# Patient Record
Sex: MALE | Race: WHITE | NOT HISPANIC OR LATINO | Employment: FULL TIME | URBAN - METROPOLITAN AREA
[De-identification: names, ages, dates, MRNs, and addresses within clinical notes are randomized per-mention and may not be internally consistent; named-entity substitution may affect disease eponyms.]

---

## 2017-01-27 ENCOUNTER — ALLSCRIPTS OFFICE VISIT (OUTPATIENT)
Dept: OTHER | Facility: OTHER | Age: 27
End: 2017-01-27

## 2017-05-02 ENCOUNTER — ALLSCRIPTS OFFICE VISIT (OUTPATIENT)
Dept: OTHER | Facility: OTHER | Age: 27
End: 2017-05-02

## 2017-06-19 ENCOUNTER — ALLSCRIPTS OFFICE VISIT (OUTPATIENT)
Dept: OTHER | Facility: OTHER | Age: 27
End: 2017-06-19

## 2017-06-19 DIAGNOSIS — R53.83 OTHER FATIGUE: ICD-10-CM

## 2017-06-19 DIAGNOSIS — M54.9 DORSALGIA: ICD-10-CM

## 2017-06-19 DIAGNOSIS — R74.02 NONSPECIFIC ELEVATION OF LEVELS OF TRANSAMINASE AND LACTIC ACID DEHYDROGENASE (LDH): ICD-10-CM

## 2017-06-19 DIAGNOSIS — R52 PAIN: ICD-10-CM

## 2017-06-19 DIAGNOSIS — R74.01 NONSPECIFIC ELEVATION OF LEVELS OF TRANSAMINASE AND LACTIC ACID DEHYDROGENASE (LDH): ICD-10-CM

## 2017-07-06 ENCOUNTER — GENERIC CONVERSION - ENCOUNTER (OUTPATIENT)
Dept: OTHER | Facility: OTHER | Age: 27
End: 2017-07-06

## 2017-07-06 LAB
AMBIG ABBREV CMP14 DEFAULT (HISTORICAL): NORMAL
BASOPHILS # BLD AUTO: 0.1 X10E3/UL (ref 0–0.2)
BASOPHILS # BLD AUTO: 1 %
DEPRECATED RDW RBC AUTO: 13.5 % (ref 12.3–15.4)
EOSINOPHIL # BLD AUTO: 0.8 X10E3/UL (ref 0–0.4)
EOSINOPHIL # BLD AUTO: 13 %
HCT VFR BLD AUTO: 44.4 % (ref 37.5–51)
HGB BLD-MCNC: 15.6 G/DL (ref 12.6–17.7)
IMM.GRANULOCYTES (CD4/8) (HISTORICAL): 0 %
IMM.GRANULOCYTES (CD4/8) (HISTORICAL): 0 X10E3/UL (ref 0–0.1)
LYMPHOCYTES # BLD AUTO: 1.8 X10E3/UL (ref 0.7–3.1)
LYMPHOCYTES # BLD AUTO: 29 %
MCH RBC QN AUTO: 29.5 PG (ref 26.6–33)
MCHC RBC AUTO-ENTMCNC: 35.1 G/DL (ref 31.5–35.7)
MCV RBC AUTO: 84 FL (ref 79–97)
MONOCYTES # BLD AUTO: 0.5 X10E3/UL (ref 0.1–0.9)
MONOCYTES (HISTORICAL): 7 %
NEUTROPHILS # BLD AUTO: 3.1 X10E3/UL (ref 1.4–7)
NEUTROPHILS # BLD AUTO: 50 %
PLATELET # BLD AUTO: 216 X10E3/UL (ref 150–379)
RBC (HISTORICAL): 5.29 X10E6/UL (ref 4.14–5.8)
WBC # BLD AUTO: 6.2 X10E3/UL (ref 3.4–10.8)

## 2017-07-07 LAB
25(OH)D3 SERPL-MCNC: 28.3 NG/ML (ref 30–100)
A/G RATIO (HISTORICAL): 1.7 (ref 1.2–2.2)
ALBUMIN SERPL BCP-MCNC: 4.7 G/DL (ref 3.5–5.5)
ALP SERPL-CCNC: 78 IU/L (ref 39–117)
ALT SERPL W P-5'-P-CCNC: 66 IU/L (ref 0–44)
AST SERPL W P-5'-P-CCNC: 169 IU/L (ref 0–40)
BACTERIA UR QL AUTO: NORMAL
BILIRUB SERPL-MCNC: 0.3 MG/DL (ref 0–1.2)
BILIRUB UR QL STRIP: NEGATIVE
BUN SERPL-MCNC: 15 MG/DL (ref 6–20)
BUN/CREA RATIO (HISTORICAL): 16 (ref 9–20)
CALCIUM SERPL-MCNC: 9.9 MG/DL (ref 8.7–10.2)
CHLORIDE SERPL-SCNC: 98 MMOL/L (ref 96–106)
CO2 SERPL-SCNC: 25 MMOL/L (ref 18–29)
COLOR UR: YELLOW
COMMENT (HISTORICAL): CLEAR
CREAT SERPL-MCNC: 0.94 MG/DL (ref 0.76–1.27)
EGFR AFRICAN AMERICAN (HISTORICAL): 131 ML/MIN/1.73
EGFR-AMERICAN CALC (HISTORICAL): 113 ML/MIN/1.73
FECAL OCCULT BLOOD DIAGNOSTIC (HISTORICAL): NEGATIVE
GLUCOSE (HISTORICAL): NEGATIVE
GLUCOSE SERPL-MCNC: 107 MG/DL (ref 65–99)
KETONES UR STRIP-MCNC: NEGATIVE MG/DL
LEUKOCYTE ESTERASE UR QL STRIP: NEGATIVE
MICROSCOPIC EXAMINATION (HISTORICAL): NORMAL
MICROSCOPIC EXAMINATION (HISTORICAL): NORMAL
NITRITE UR QL STRIP: NEGATIVE
NON-SQ EPI CELLS URNS QL MICRO: NORMAL /HPF
PH UR STRIP.AUTO: 7.5 [PH] (ref 5–7.5)
POTASSIUM SERPL-SCNC: 4.5 MMOL/L (ref 3.5–5.2)
PROT UR STRIP-MCNC: NEGATIVE MG/DL
RBC (HISTORICAL): NORMAL /HPF
SODIUM SERPL-SCNC: 139 MMOL/L (ref 134–144)
SP GR UR STRIP.AUTO: 1.02 (ref 1–1.03)
TOT. GLOBULIN, SERUM (HISTORICAL): 2.7 G/DL (ref 1.5–4.5)
TOTAL PROTEIN (HISTORICAL): 7.4 G/DL (ref 6–8.5)
TSH SERPL DL<=0.05 MIU/L-ACNC: 1.89 UIU/ML (ref 0.45–4.5)
URINALYSIS (UA) (HISTORICAL): NORMAL
UROBILINOGEN UR QL STRIP.AUTO: 0.2 EU/DL (ref 0.2–1)
WBC # BLD AUTO: NORMAL /HPF

## 2017-07-19 ENCOUNTER — GENERIC CONVERSION - ENCOUNTER (OUTPATIENT)
Dept: OTHER | Facility: OTHER | Age: 27
End: 2017-07-19

## 2017-07-20 ENCOUNTER — HOSPITAL ENCOUNTER (OUTPATIENT)
Dept: ULTRASOUND IMAGING | Facility: HOSPITAL | Age: 27
Discharge: HOME | End: 2017-07-20
Attending: INTERNAL MEDICINE
Payer: COMMERCIAL

## 2017-07-20 DIAGNOSIS — R74.01 NONSPECIFIC ELEVATION OF LEVELS OF TRANSAMINASE AND LACTIC ACID DEHYDROGENASE (LDH): ICD-10-CM

## 2017-07-20 DIAGNOSIS — R74.02 NONSPECIFIC ELEVATION OF LEVELS OF TRANSAMINASE AND LACTIC ACID DEHYDROGENASE (LDH): ICD-10-CM

## 2017-07-20 LAB
ALBUMIN SERPL BCP-MCNC: 5 G/DL (ref 3.5–5.5)
ALP SERPL-CCNC: 75 IU/L (ref 39–117)
ALPHA-1 ANTITRYPSIN (HISTORICAL): 106 MG/DL (ref 90–200)
ALT SERPL W P-5'-P-CCNC: 13 IU/L (ref 0–44)
AST SERPL W P-5'-P-CCNC: 18 IU/L (ref 0–40)
BILIRUB SERPL-MCNC: 1.3 MG/DL (ref 0–1.2)
BILIRUBIN DIRECT (HISTORICAL): 0.23 MG/DL (ref 0–0.4)
CERULOPLASMIN (HISTORICAL): 18.2 MG/DL (ref 16–31)
CMV IGG (HISTORICAL): <0.6 U/ML (ref 0–0.59)
CMV IGM (HISTORICAL): <30 AU/ML (ref 0–29.9)
EBV EARLY ANTIGEN AB, IGG (HISTORICAL): <9 U/ML (ref 0–8.9)
EBV INTERPRETATION (HISTORICAL): ABNORMAL
ENDOMYSIAL IGA ANTIBODY (HISTORICAL): NEGATIVE
EPSTEIN-BARR NUCLEAR ANTIGEN AB IGG (HISTORICAL): 75.8 U/ML (ref 0–17.9)
EPSTEIN-BARR VCA IGG (HISTORICAL): 364 U/ML (ref 0–17.9)
EPSTEIN-BARR VCA IGM (HISTORICAL): <36 U/ML (ref 0–35.9)
FERRITIN SERPL-MCNC: 118 NG/ML (ref 30–400)
GGT (HISTORICAL): 24 IU/L (ref 0–65)
GLIADIN IGA ANTIBODIES (HISTORICAL): 4 UNITS (ref 0–19)
GLIADIN IGG ANTIBODIES (HISTORICAL): 3 UNITS (ref 0–19)
HEPATITIS B CORE IGM ANTIBODY (HISTORICAL): NEGATIVE
HEPATITIS B CORE TOTAL ANTIBODY (HISTORICAL): NEGATIVE
HEPATITIS B SURFACE ANTIBODY (HISTORICAL): <3.1 MIU/ML
HEPATITIS B SURFACE ANTIGEN (HISTORICAL): NEGATIVE
HEPATITIS C ANTIBODY (HISTORICAL): <0.1 S/CO RATIO (ref 0–0.9)
IGA (HISTORICAL): 155 MG/DL (ref 90–386)
IRON SATN MFR SERPL: 36 % (ref 15–55)
IRON SERPL-MCNC: 120 UG/DL (ref 38–169)
LIVER-KIDNEY MICROSOMAL AB (HISTORICAL): 1 UNITS (ref 0–20)
MITOCHONDRIAL(M2)AB (HISTORICAL): 2.6 UNITS (ref 0–20)
SMOOTH MUSCLE ANTIBODY (HISTORICAL): 16 UNITS (ref 0–19)
TIBC SERPL-MCNC: 334 UG/DL (ref 250–450)
TISSUE TRANSGLUTAMINASE AB IGG (HISTORICAL): <2 U/ML (ref 0–5)
TOTAL PROTEIN (HISTORICAL): 7.8 G/DL (ref 6–8.5)
TTG IGA (HISTORICAL): <2 U/ML (ref 0–3)
UIBC (HISTORICAL): 214 UG/DL (ref 111–343)

## 2017-07-20 PROCEDURE — 76705 ECHO EXAM OF ABDOMEN: CPT

## 2017-11-03 ENCOUNTER — ALLSCRIPTS OFFICE VISIT (OUTPATIENT)
Dept: OTHER | Facility: OTHER | Age: 27
End: 2017-11-03

## 2017-11-09 NOTE — PROGRESS NOTES
Assessment    1  Skin rash (782 1) (R21)    Plan  Skin rash    · PredniSONE 10 MG Oral Tablet; TAKE 4 TABLETS DAILY FOR 3 DAYS,3 TABLETS DAILY FOR3 DAYS, 2 TABLETS DAILY FOR 3 DAYS AND 1 TABLET DAILY FOR 3 DAYS, THEN STOP   · 1 - Sofi BERG, Livia Sylvester  (Dermatology) Co-Management  *  Status: Hold For - Scheduling  Requestedfor: [id number]  Care Summary provided  : Yes   · Follow-up PRN Evaluation and Treatment  Follow-up  Status: Complete  Done: [id number]    Discussion/Summary  Discussion Summary:   Skin rash-will try Prednisone tapering doses and refer to dermatologist   up as needed  Counseling Documentation With Imm: The patient was counseled regarding instructions for management,-- risk factor reductions,-- patient and family education,-- impressions,-- risks and benefits of treatment options,-- importance of compliance with treatment  total time of encounter was 20 minutes-- and-- 5 minutes was spent counseling  Medication SE Review and Pt Understands Tx: Possible side effects of new medications were reviewed with the patient/guardian today  The treatment plan was reviewed with the patient/guardian  The patient/guardian understands and agrees with the treatment plan      Chief Complaint  Chief Complaint Free Text Note Form: Patient presents offers c/o spot on his back and belly  History of Present Illness  HPI: Pt with macular rash on trunk and back for about six months, used Lotrimin without relief  Itchy on back only  Review of Systems  Complete-Male:  Constitutional: No fever or chills, feels well, no tiredness, no recent weight gain or weight loss  Cardiovascular: No complaints of slow heart rate, no fast heart rate, no chest pain, no palpitations, no leg claudication, no lower extremity  Respiratory: No complaints of shortness of breath, no wheezing, no cough, no SOB on exertion, no orthopnea or PND    Gastrointestinal: No complaints of abdominal pain, no constipation, no nausea or vomiting, no diarrhea or bloody stools  Genitourinary: No complaints of dysuria, no incontinence, no hesitancy, no nocturia, no genital lesion, no testicular pain  Musculoskeletal: No complaints of arthralgia, no myalgias, no joint swelling or stiffness, no limb pain or swelling  Integumentary: a rash  Neurological: No compliants of headache, no confusion, no convulsions, no numbness or tingling, no dizziness or fainting, no limb weakness, no difficulty walking  Psychiatric: Is not suicidal, no sleep disturbances, no anxiety or depression, no change in personality, no emotional problems  Active Problems  1  Abnormal AST and ALT (790 4) (R74 8)   2  Acne (706 1) (L70 9)   3  ADHD, predominantly inattentive type (314 00) (F90 0)   4  Anxiety (300 00) (F41 9)   5  Back pain (724 5) (M54 9)   6  Chronic rhinitis (472 0) (J31 0)   7  Diffuse cervicobrachial syndrome (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 3) (M53 1)   8  Fatigue (780 79) (R53 83)   9  Generalized pain (780 96) (R52)   10  Pityriasis rosea (696 3) (L42)   11  Skin rash (782 1) (R21)    Past Medical History  1  History of Acute maxillary sinusitis (461 0) (J01 00)   2  History of Encounter for general health examination (V70 0) (Z00 00)   3  History of influenza (V12 09) (Z87 09)  Active Problems And Past Medical History Reviewed: The active problems and past medical history were reviewed and updated today  Surgical History  1  History Of Prior Surgery   2  History of Shoulder Surgery  Surgical History Reviewed: The surgical history was reviewed and updated today  Family History  Mother    1  Family history of Arthritis (V17 7)  Father    2  Family history of Diabetes Mellitus (V18 0)   3  Family history of Hypertension (V17 49)   4  Family history of Pure Hypercholesterolemia  Family History Reviewed: The family history was reviewed and updated today  Social History     · Caffeine Use   · Never A Smoker   · Never Drank Alcohol  Social History Reviewed:  The social history was reviewed and updated today  Current Meds   1  Adderall XR 5 MG Oral Capsule Extended Release 24 Hour; TAKE 1 CAPSULE DAILY FOR ADHD; Therapy: (Recorded:63Qqr1133) to Recorded   2  Culturelle Oral Capsule; One tablet twice a day while on antibiotics and continued for one week when off antibiotic; Therapy: [id number] to (Last MK:34XNM3981) Ordered  Medication List Reviewed: The medication list was reviewed and updated today  Allergies  1  No Known Drug Allergies    Vitals  Vital Signs    Recorded: 43LVY2984 11:10AM   Temperature 97 7 F   Heart Rate 285   Systolic 540, LUE   Diastolic 90, LUE   Height 5 ft 10 in   Weight 179 lb    BMI Calculated 25 68   BSA Calculated 1 99   O2 Saturation 95       Physical Exam   Constitutional  General appearance: No acute distress, well appearing and well nourished  Pulmonary  Respiratory effort: No increased work of breathing or signs of respiratory distress  Auscultation of lungs: Clear to auscultation, equal breath sounds bilaterally, no wheezes, no rales, no rhonci  Cardiovascular  Auscultation of heart: Normal rate and rhythm, normal S1 and S2, without murmurs  Carotid pulses: Normal    Lymphatic  Palpation of lymph nodes in neck: No lymphadenopathy     Musculoskeletal  Gait and station: Normal    Skin  Skin and subcutaneous tissue: Abnormal  -- (macular erythematous rash on trunk and back)  Psychiatric  Orientation to person, place and time: Normal    Mood and affect: Normal          Signatures   Electronically signed by : KIM Smith; Nov  3 2017 11:38AM EST                       (Author)    Electronically signed by : Carole Schmidt DO; Nov 8 2017 10:01AM EST

## 2018-01-12 VITALS
BODY MASS INDEX: 26.82 KG/M2 | OXYGEN SATURATION: 96 % | DIASTOLIC BLOOD PRESSURE: 70 MMHG | HEIGHT: 70 IN | SYSTOLIC BLOOD PRESSURE: 124 MMHG | TEMPERATURE: 96.7 F | HEART RATE: 77 BPM | WEIGHT: 187.38 LBS

## 2018-01-13 VITALS
DIASTOLIC BLOOD PRESSURE: 80 MMHG | TEMPERATURE: 97.6 F | OXYGEN SATURATION: 98 % | SYSTOLIC BLOOD PRESSURE: 118 MMHG | BODY MASS INDEX: 25.97 KG/M2 | HEIGHT: 70 IN | HEART RATE: 79 BPM | WEIGHT: 181.38 LBS

## 2018-01-14 VITALS
TEMPERATURE: 97.7 F | HEIGHT: 70 IN | BODY MASS INDEX: 25.62 KG/M2 | SYSTOLIC BLOOD PRESSURE: 130 MMHG | WEIGHT: 179 LBS | DIASTOLIC BLOOD PRESSURE: 90 MMHG | HEART RATE: 100 BPM | OXYGEN SATURATION: 95 %

## 2018-01-14 VITALS
HEIGHT: 70 IN | BODY MASS INDEX: 26.82 KG/M2 | HEART RATE: 82 BPM | DIASTOLIC BLOOD PRESSURE: 70 MMHG | TEMPERATURE: 97 F | OXYGEN SATURATION: 98 % | WEIGHT: 187.38 LBS | SYSTOLIC BLOOD PRESSURE: 120 MMHG

## 2018-01-15 NOTE — PROGRESS NOTES
Assessment    1  Encounter for general health examination (V70 0) (Z00 00)    Plan  Encounter for general health examination    · Follow-up PRN Evaluation and Treatment  Follow-up  Status: Complete  Done:  [id number] 08:50AM    Discussion/Summary  Impression: health maintenance visit  Currently, he eats a healthy diet  Prostate cancer screening: PSA is not indicated  Testicular cancer screening: testicular cancer screening is not indicated  Colorectal cancer screening: colorectal cancer screening is not indicated  The immunizations are up to date  Pt will be able to perform Physical Readiness Testing for [place] [facility] Police application  He should return for follow up as needed  Chief Complaint  pt presents for Physical       History of Present Illness  HM, Adult Male:   Social History: Work status: occupation: landscaping and self-employed  The patient has never smoked cigarettes  He reports occasional alcohol use  He has never used illicit drugs  General Health: The patient's health since the last visit is described as good  He has regular dental visits  He denies vision problems  He denies hearing loss  Immunizations status: up to date  Lifestyle:  He consumes a diverse and healthy diet  He exercises regularly  He does not use tobacco  He denies drug use  Screening:   HPI: Pt presents for physical to apply to [employer]  Review of Systems    Constitutional: No fever or chills, feels well, no tiredness, no recent weight gain or weight loss  Eyes: No complaints of eye pain, no red eyes, no discharge from eyes, no itchy eyes  ENT: no complaints of earache, no hearing loss, no nosebleeds, no nasal discharge, no sore throat, no hoarseness  Cardiovascular: No complaints of slow heart rate, no fast heart rate, no chest pain, no palpitations, no leg claudication, no lower extremity     Respiratory: No complaints of shortness of breath, no wheezing, no cough, no SOB on exertion, no orthopnea or PND    Gastrointestinal: No complaints of abdominal pain, no constipation, no nausea or vomiting, no diarrhea or bloody stools  Genitourinary: No complaints of dysuria, no incontinence, no hesitancy, no nocturia, no genital lesion, no testicular pain  Musculoskeletal: No complaints of arthralgia, no myalgias, no joint swelling or stiffness, no limb pain or swelling  Integumentary: No complaints of skin rash or skin lesions, no itching, no skin wound, no dry skin  Neurological: No compliants of headache, no confusion, no convulsions, no numbness or tingling, no dizziness or fainting, no limb weakness, no difficulty walking  Psychiatric: Is not suicidal, no sleep disturbances, no anxiety or depression, no change in personality, no emotional problems  Endocrine: No complaints of proptosis, no hot flashes, no muscle weakness, no erectile dysfunction, no deepening of the voice, no feelings of weakness  Hematologic/Lymphatic: No complaints of swollen glands, no swollen glands in the neck, does not bleed easily, no easy bruising  Active Problems    1  Acne (706 1) (L70 9)   2  Acute maxillary sinusitis (461 0) (J01 00)   3  ADHD, predominantly inattentive type (314 00) (F90 0)   4  Anxiety (300 00) (F41 9)   5  Chronic rhinitis (472 0) (J31 0)   6  Diffuse cervicobrachial syndrome (723 3) (M53 1)   7  Fatigue (780 79) (R53 83)   8  Generalized pain (780 96) (R52)   9  Pityriasis rosea (696 3) (L42)   10  Skin rash (782 1) (R21)    Surgical History    · History Of Prior Surgery   · History of Shoulder Surgery    Family History    · Family history of Arthritis (V17 7)    · Family history of Diabetes Mellitus (V18 0)   · Family history of Hypercholesterolemia   · Family history of Hypertension (V17 49)    Social History    · Caffeine Use   · Never A Smoker   · Never Drank Alcohol    Current Meds   1  Adderall XR 5 MG Oral Capsule Extended Release 24 Hour; TAKE 1 CAPSULE DAILY   FOR ADHD;    Therapy: (Recorded:99Qnm9021) to Recorded    Allergies    1  No Known Drug Allergies    Vitals   Recorded: 11PZV3853 08:23AM   Temperature 97 1 F   Heart Rate 92   Systolic 523   Diastolic 68   Height 5 ft 10 in   Weight 173 lb 6 08 oz   BMI Calculated 24 88   BSA Calculated 1 97   O2 Saturation 98     Physical Exam    Constitutional   General appearance: No acute distress, well appearing and well nourished  Head and Face   Head and face: Normal     Palpation of the face and sinuses: No sinus tenderness  Eyes   Conjunctiva and lids: No erythema, swelling or discharge  Pupils and irises: Equal, round, reactive to light  Ophthalmoscopic examination: Normal fundi and optic discs  Ears, Nose, Mouth, and Throat   External inspection of ears and nose: Normal     Otoscopic examination: Tympanic membranes translucent with normal light reflex  Canals patent without erythema  Hearing: Normal     Nasal mucosa, septum, and turbinates: Normal without edema or erythema  Lips, teeth, and gums: Normal, good dentition  Oropharynx: Normal with no erythema, edema, exudate or lesions  Neck   Neck: Supple, symmetric, trachea midline, no masses  Thyroid: Normal, no thyromegaly  Pulmonary   Respiratory effort: No increased work of breathing or signs of respiratory distress  Auscultation of lungs: Clear to auscultation  Cardiovascular   Auscultation of heart: Normal rate and rhythm, normal S1 and S2, no murmurs  Pedal pulses: 2+ bilaterally  Examination of extremities for edema and/or varicosities: Normal     Abdomen   Abdomen: Non-tender, no masses  Lymphatic   Palpation of lymph nodes in neck: No lymphadenopathy  Musculoskeletal   Gait and station: Normal     Inspection/palpation of digits and nails: Normal without clubbing or cyanosis      Inspection/palpation of joints, bones, and muscles: Normal     Range of motion: Normal     Stability: Normal     Muscle strength/tone: Normal     Skin   Skin and subcutaneous tissue: Normal without rashes or lesions  Neurologic   Reflexes: 2+ and symmetric  Sensation: No sensory loss  Coordination: Normal finger to nose and heel to shin  Psychiatric   Judgment and insight: Normal     Orientation to person, place and time: Normal     Recent and remote memory: Intact  Mood and affect: Normal        Results/Data  PHQ-9 Adult Depression Screening 22Mar2016 08:33AM User, Ahs     Test Name Result Flag Reference   PHQ-9 Adult Depression Score 5     Q1: 0, Q2: 0, Q3: 1, Q4: 1, Q5: 0, Q6: 0, Q7: 2, Q8: 1, Q9: 0   PHQ-9 Adult Depression Screening Negative     PHQ-9 Difficulty Level Not difficult at all     PHQ-9 Severity Mild Depression         Procedure    Procedure:   Inforrmation supplied by a Snellen chart  Results: 20/20/20 in both eyes without corrective device, 20/20/20 in the right eye without corrective device, 20/20/20 in the left eye without corrective device normal in both eyes        Signatures   Electronically signed by : Leslee Jefferson ; Mar 22 2016  8:53AM EST                       (Author)    Electronically signed by : Joseph Paz DO; Mar 22 2016 12:21PM EST

## 2018-03-16 ENCOUNTER — OFFICE VISIT (OUTPATIENT)
Dept: INTERNAL MEDICINE CLINIC | Facility: CLINIC | Age: 28
End: 2018-03-16
Payer: COMMERCIAL

## 2018-03-16 ENCOUNTER — DOCUMENTATION (OUTPATIENT)
Dept: INTERNAL MEDICINE CLINIC | Facility: CLINIC | Age: 28
End: 2018-03-16

## 2018-03-16 VITALS
WEIGHT: 187 LBS | SYSTOLIC BLOOD PRESSURE: 122 MMHG | HEART RATE: 99 BPM | OXYGEN SATURATION: 96 % | BODY MASS INDEX: 26.77 KG/M2 | HEIGHT: 70 IN | TEMPERATURE: 96.8 F | DIASTOLIC BLOOD PRESSURE: 76 MMHG | RESPIRATION RATE: 16 BRPM

## 2018-03-16 DIAGNOSIS — J01.10 ACUTE NON-RECURRENT FRONTAL SINUSITIS: Primary | ICD-10-CM

## 2018-03-16 PROBLEM — R74.8 ABNORMAL AST AND ALT: Status: ACTIVE | Noted: 2017-07-10

## 2018-03-16 PROBLEM — L73.0 ACNE KELOID: Status: ACTIVE | Noted: 2017-12-05

## 2018-03-16 PROCEDURE — 99213 OFFICE O/P EST LOW 20 MIN: CPT | Performed by: NURSE PRACTITIONER

## 2018-03-16 RX ORDER — DEXTROAMPHETAMINE SACCHARATE, AMPHETAMINE ASPARTATE MONOHYDRATE, DEXTROAMPHETAMINE SULFATE AND AMPHETAMINE SULFATE 1.25; 1.25; 1.25; 1.25 MG/1; MG/1; MG/1; MG/1
1 CAPSULE, EXTENDED RELEASE ORAL DAILY
COMMUNITY
End: 2018-04-11

## 2018-03-16 RX ORDER — PREDNISONE 10 MG/1
10 TABLET ORAL DAILY
Qty: 5 TABLET | Refills: 0 | Status: SHIPPED | OUTPATIENT
Start: 2018-03-16 | End: 2018-04-11

## 2018-03-16 RX ORDER — DEXTROAMPHETAMINE SULFATE, DEXTROAMPHETAMINE SACCHARATE, AMPHETAMINE SULFATE AND AMPHETAMINE ASPARTATE 6.25; 6.25; 6.25; 6.25 MG/1; MG/1; MG/1; MG/1
25 CAPSULE, EXTENDED RELEASE ORAL EVERY MORNING
Refills: 0 | COMMUNITY
Start: 2018-02-20

## 2018-03-16 RX ORDER — AMOXICILLIN AND CLAVULANATE POTASSIUM 875; 125 MG/1; MG/1
1 TABLET, FILM COATED ORAL EVERY 12 HOURS SCHEDULED
Qty: 20 TABLET | Refills: 0 | Status: SHIPPED | OUTPATIENT
Start: 2018-03-16 | End: 2018-03-26

## 2018-03-16 NOTE — PROGRESS NOTES
Assessment/Plan:    Sinusitis-will start antibiotic and low dose Prednisone, continue Mucinex  Call if not better  Diagnoses and all orders for this visit:    Acute non-recurrent frontal sinusitis  -     amoxicillin-clavulanate (AUGMENTIN) 875-125 mg per tablet; Take 1 tablet by mouth every 12 (twelve) hours for 10 days  -     predniSONE 10 mg tablet; Take 1 tablet (10 mg total) by mouth daily    Other orders  -     amphetamine-dextroamphetamine (ADDERALL XR, 5MG,) 5 MG 24 hr capsule; Take 1 capsule by mouth daily  -     ADDERALL XR, 25MG, 25 MG 24 hr capsule; 25 mg every morning        The patient was counseled regarding instructions for management, risk factor reductions, patient and family education,impressions, risks and benefits of treatment options, side effects of medications, importance of compliance with treatment  The treatment plan was reviewed with the patient/guardian and patient/guardian understands and agrees with the treatment plan  Subjective:      Patient ID: Aimee Sandoval is a 24 y o  male  Pat few days with sinus congestion, sinus pressure, sore throat, no fever  Taking Mucinex which helps a little  The following portions of the patient's history were reviewed and updated as appropriate:   He has no past medical history on file  ,   does not have any pertinent problems on file  ,   has a past surgical history that includes Shoulder surgery (03/27/2015)  ,  family history includes No Known Problems in his father and mother  ,   reports that he has never smoked  He has quit using smokeless tobacco  He reports that he drinks alcohol  He reports that he does not use drugs  ,  has No Known Allergies       Review of Systems   Constitutional: Negative  HENT: Positive for ear pain, rhinorrhea, sinus pain, sinus pressure and sore throat  Respiratory: Positive for cough  Cardiovascular: Negative  Musculoskeletal: Negative  Psychiatric/Behavioral: Negative  Objective:     Physical Exam   Constitutional: He is oriented to person, place, and time  He appears well-developed and well-nourished  Cardiovascular: Normal rate, regular rhythm, normal heart sounds and intact distal pulses  Pulmonary/Chest: Effort normal and breath sounds normal    Musculoskeletal: Normal range of motion  Neurological: He is alert and oriented to person, place, and time  He has normal reflexes  Psychiatric: He has a normal mood and affect   His behavior is normal  Judgment and thought content normal

## 2018-04-02 ENCOUNTER — HOSPITAL ENCOUNTER (EMERGENCY)
Facility: HOSPITAL | Age: 28
Discharge: HOME | End: 2018-04-02
Attending: EMERGENCY MEDICINE | Admitting: EMERGENCY MEDICINE
Payer: OTHER MISCELLANEOUS

## 2018-04-02 ENCOUNTER — APPOINTMENT (EMERGENCY)
Dept: RADIOLOGY | Facility: HOSPITAL | Age: 28
End: 2018-04-02
Payer: OTHER MISCELLANEOUS

## 2018-04-02 VITALS
TEMPERATURE: 98.1 F | SYSTOLIC BLOOD PRESSURE: 127 MMHG | DIASTOLIC BLOOD PRESSURE: 68 MMHG | WEIGHT: 180 LBS | HEIGHT: 72 IN | OXYGEN SATURATION: 97 % | HEART RATE: 85 BPM | BODY MASS INDEX: 24.38 KG/M2 | RESPIRATION RATE: 18 BRPM

## 2018-04-02 DIAGNOSIS — M79.631 RIGHT FOREARM PAIN: Primary | ICD-10-CM

## 2018-04-02 PROCEDURE — 99284 EMERGENCY DEPT VISIT MOD MDM: CPT

## 2018-04-02 PROCEDURE — 73090 X-RAY EXAM OF FOREARM: CPT

## 2018-04-02 RX ORDER — IBUPROFEN 600 MG/1
600 TABLET ORAL EVERY 6 HOURS PRN
Qty: 30 TABLET | Refills: 0 | Status: SHIPPED | OUTPATIENT
Start: 2018-04-02 | End: 2018-05-31 | Stop reason: ALTCHOICE
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
## 2018-04-02 NOTE — DISCHARGE INSTRUCTIONS
Arm Pain   WHAT YOU NEED TO KNOW:   Your arm pain may be caused by a number of conditions  Examples include arthritis, nerve problems, or an awkward position while you sleep  X-rays did not show a broken bone in your arm or wrist  Arm pain may be a sign of a serious condition that needs immediate care, such as a heart attack  DISCHARGE INSTRUCTIONS:   Call 911 for any of the following: You have any of the following signs of a heart attack:   · Squeezing, pressure, or pain in your chest that lasts longer than 5 minutes or returns    · Discomfort or pain in your back, neck, jaw, stomach, or arm     · Trouble breathing or a fast, fluttery heartbeat    · Nausea or vomiting    · Lightheadedness or a sudden cold sweat, especially with chest pain or trouble breathing  Return to the emergency department if:   · You have severe pain, or pain that spreads from your arm to other areas  · You have swelling, tingling, or numbness in your hand or fingers, or the skin turns blue  · You cannot move your arm  Contact your healthcare provider if:   · You have questions or concerns about your condition or care  Medicines: You may need any of the following:  · Prescription pain medicine  may be given  Ask how to take this medicine safely  · NSAIDs , such as ibuprofen, help decrease swelling, pain, and fever  This medicine is available with or without a doctor's order  NSAIDs can cause stomach bleeding or kidney problems in certain people  If you take blood thinner medicine, always ask your healthcare provider if NSAIDs are safe for you  Always read the medicine label and follow directions  · Take your medicine as directed  Contact your healthcare provider if you think your medicine is not helping or if you have side effects  Tell him or her if you are allergic to any medicine  Keep a list of the medicines, vitamins, and herbs you take  Include the amounts, and when and why you take them   Bring the list or the pill bottles to follow-up visits  Carry your medicine list with you in case of an emergency  Self-care:   · Rest your arm as directed  A sling may be used to keep your arm from moving while it heals  · Apply ice as directed  Ice helps decrease pain and swelling  Ice may also help prevent tissue damage  Use an ice pack, or put crushed ice in a plastic bag  Cover it with a towel  Apply it to your arm for 20 minutes every few hours, or as directed  Ask how many times to apply ice each day, and for how many days  · Elevate your arm above the level of your heart as often as you can  This will help decrease swelling and pain  Prop your arm on pillows or blankets to keep the area elevated comfortably  · Adjust your position if you work in front of a computer  You may need arm or wrist supports or change the height of your chair  · Keep a pain record  Write down when your pain happens and how severe it is  Include any other symptoms you have with your pain  A record will help you keep track of pain cycles  Bring the record with you to your follow-up visits  It may also help your healthcare provider find out what is causing your pain  Follow up with your healthcare provider as directed: You may need physical therapy  You may need to see an orthopedic specialist  Write down your questions so you remember to ask them during your visits  © 2017 [employer] Information is for End User's use only and may not be sold, redistributed or otherwise used for commercial purposes  All illustrations and images included in CareNotes® are the copyrighted property of [employer]  or [employer]har[employer]  The above information is an  only  It is not intended as medical advice for individual conditions or treatments  Talk to your doctor, nurse or pharmacist before following any medical regimen to see if it is safe and effective for you

## 2018-04-04 ENCOUNTER — APPOINTMENT (OUTPATIENT)
Dept: RADIOLOGY | Facility: MEDICAL CENTER | Age: 28
End: 2018-04-04
Payer: OTHER MISCELLANEOUS

## 2018-04-04 ENCOUNTER — APPOINTMENT (OUTPATIENT)
Dept: URGENT CARE | Facility: MEDICAL CENTER | Age: 28
End: 2018-04-04
Payer: OTHER MISCELLANEOUS

## 2018-04-04 DIAGNOSIS — M54.2 NECK PAIN: ICD-10-CM

## 2018-04-04 DIAGNOSIS — M54.2 NECK PAIN: Primary | ICD-10-CM

## 2018-04-04 PROCEDURE — G0382 LEV 3 HOSP TYPE B ED VISIT: HCPCS

## 2018-04-04 PROCEDURE — 99283 EMERGENCY DEPT VISIT LOW MDM: CPT

## 2018-04-04 PROCEDURE — 72040 X-RAY EXAM NECK SPINE 2-3 VW: CPT

## 2018-04-11 ENCOUNTER — OFFICE VISIT (OUTPATIENT)
Dept: INTERNAL MEDICINE CLINIC | Facility: CLINIC | Age: 28
End: 2018-04-11
Payer: COMMERCIAL

## 2018-04-11 VITALS
HEART RATE: 103 BPM | WEIGHT: 178 LBS | RESPIRATION RATE: 18 BRPM | BODY MASS INDEX: 25.48 KG/M2 | TEMPERATURE: 97.6 F | OXYGEN SATURATION: 98 % | DIASTOLIC BLOOD PRESSURE: 62 MMHG | SYSTOLIC BLOOD PRESSURE: 90 MMHG | HEIGHT: 70 IN

## 2018-04-11 DIAGNOSIS — J01.91 ACUTE RECURRENT SINUSITIS, UNSPECIFIED LOCATION: Primary | ICD-10-CM

## 2018-04-11 PROCEDURE — 99213 OFFICE O/P EST LOW 20 MIN: CPT | Performed by: INTERNAL MEDICINE

## 2018-04-11 RX ORDER — LACTOBACILLUS RHAMNOSUS GG 10B CELL
CAPSULE ORAL
Qty: 40 CAPSULE | Refills: 0 | Status: SHIPPED | OUTPATIENT
Start: 2018-04-11 | End: 2018-07-13

## 2018-04-11 RX ORDER — CEFUROXIME AXETIL 250 MG/1
250 TABLET ORAL EVERY 12 HOURS SCHEDULED
Qty: 20 TABLET | Refills: 0 | Status: SHIPPED | OUTPATIENT
Start: 2018-04-11 | End: 2018-04-21

## 2018-04-11 NOTE — PROGRESS NOTES
Assessment/Plan:    1  Recurrence sinusitis will try cefuroxime twice a day to use Culturelle 1 tablet twice a day while on antibiotics and for 1 week while off if he has a recurrence will do allergy testing may consider CT scan of the sinuses patient has had dogs for many years probably not the etiology       Diagnoses and all orders for this visit:    Acute recurrent sinusitis, unspecified location  -     cefuroxime (CEFTIN) 250 mg tablet; Take 1 tablet (250 mg total) by mouth every 12 (twelve) hours for 10 days  -     Lactobacillus-Inulin ([address]) CAPS; Bid while on antibiotics and for  1 weekoff        The patient was counseled regarding instructions for management, risk factor reductions, patient and family education,impressions, risks and benefits of treatment options, side effects of medications, importance of compliance with treatment  The treatment plan was reviewed with the patient/guardian and patient/guardian understands and agrees with the treatment plan  Subjective:      Patient ID: Lon Kirk is a 24 y o  male  1 month sinusitis improved with antibiotic recurred 2 days ago congestion no fever, some GERD,  Yellow         The following portions of the patient's history were reviewed and updated as appropriate:   He has no past medical history on file  ,   does not have any pertinent problems on file  ,   has a past surgical history that includes Shoulder surgery (03/27/2015)  ,  family history includes No Known Problems in his father and mother  ,   reports that he has never smoked  He has quit using smokeless tobacco  He reports that he drinks alcohol  He reports that he does not use drugs  ,  has No Known Allergies       Review of Systems   Constitutional: Negative for appetite change, chills, fatigue, fever and unexpected weight change  HENT: Positive for sinus pain   Negative for congestion, ear pain, facial swelling, hearing loss, mouth sores, nosebleeds, postnasal drip, rhinorrhea, sore throat, trouble swallowing and voice change  Eyes: Negative for pain, discharge, redness and visual disturbance  Respiratory: Negative for apnea, chest tightness, shortness of breath, wheezing and stridor  Cardiovascular: Negative for chest pain, palpitations and leg swelling  Gastrointestinal: Negative for abdominal distention, abdominal pain, blood in stool, constipation, diarrhea and vomiting  Endocrine: Negative for cold intolerance, heat intolerance, polydipsia, polyphagia and polyuria  Genitourinary: Negative for difficulty urinating, dysuria, flank pain, frequency, genital sores, hematuria and urgency  Musculoskeletal: Negative for arthralgias and back pain  Skin: Negative for rash and wound  Allergic/Immunologic: Negative for environmental allergies, food allergies and immunocompromised state  Neurological: Negative for dizziness, tremors, seizures, syncope, facial asymmetry, speech difficulty, weakness, light-headedness, numbness and headaches  Hematological: Negative for adenopathy  Does not bruise/bleed easily  Psychiatric/Behavioral: Negative for agitation, behavioral problems, dysphoric mood, hallucinations, self-injury, sleep disturbance and suicidal ideas  The patient is not hyperactive  Objective:     Physical Exam   Constitutional: He is oriented to person, place, and time  He appears well-developed  HENT:   Right Ear: Tympanic membrane, external ear and ear canal normal    Left Ear: Tympanic membrane, external ear and ear canal normal    Nose: Mucosal edema present  Right sinus exhibits no maxillary sinus tenderness and no frontal sinus tenderness  Left sinus exhibits no frontal sinus tenderness  Erythema of the right nasopharynx   Eyes: Right eye exhibits no discharge  Left eye exhibits no discharge  No scleral icterus  Neck: Carotid bruit is not present  No tracheal deviation present  No thyroid mass and no thyromegaly present  Cardiovascular: Normal rate, regular rhythm, normal heart sounds and intact distal pulses  Exam reveals no gallop and no friction rub  No murmur heard  Pulmonary/Chest: No respiratory distress  He has no wheezes  He has no rales  Musculoskeletal: He exhibits no edema  Lymphadenopathy:     He has no cervical adenopathy  Neurological: He is alert and oriented to person, place, and time  Coordination normal    Psychiatric: He has a normal mood and affect  His behavior is normal  Judgment and thought content normal    Nursing note and vitals reviewed

## 2018-04-11 NOTE — PATIENT INSTRUCTIONS
Sinusitis   AMBULATORY CARE:   Sinusitis  is inflammation or infection of your sinuses  It is most often caused by a virus  Acute sinusitis may last up to 12 weeks  Chronic sinusitis lasts longer than 12 weeks  Recurrent sinusitis means you have 4 or more times in 1 year  Common symptoms include the following:   · Fever    · Pain, pressure, redness, or swelling around the forehead, cheeks, or eyes    · Thick yellow or green discharge from your nose    · Tenderness when you touch your face over your sinuses    · Dry cough that happens mostly at night or when you lie down    · Headache and face pain that is worse when you lean forward    · Tooth pain, or pain when you chew  Seek care immediately if:   · Your eye and eyelid are red, swollen, and painful  · You cannot open your eye  · You have vision changes, such as double vision  · Your eyeball bulges out or you cannot move your eye  · You are more sleepy than normal, or you notice changes in your ability to think, move, or talk  · You have a stiff neck, a fever, or a bad headache  · You have swelling of your forehead or scalp  Contact your healthcare provider if:   · Your symptoms do not improve after 3 days  · Your symptoms do not go away after 10 days  · You have nausea and are vomiting  · Your nose is bleeding  · You have questions or concerns about your condition or care  Treatment for sinusitis:  Your symptoms may go away on their own  Your healthcare provider may recommend watchful waiting for up to 10 days before starting antibiotics  You may  need any of the following:  · Acetaminophen  decreases pain and fever  It is available without a doctor's order  Ask how much to take and how often to take it  Follow directions  Read the labels of all other medicines you are using to see if they also contain acetaminophen, or ask your doctor or pharmacist  Acetaminophen can cause liver damage if not taken correctly   Do not use more than 4 grams (4,000 milligrams) total of acetaminophen in one day  · NSAIDs , such as ibuprofen, help decrease swelling, pain, and fever  This medicine is available with or without a doctor's order  NSAIDs can cause stomach bleeding or kidney problems in certain people  If you take blood thinner medicine, always ask your healthcare provider if NSAIDs are safe for you  Always read the medicine label and follow directions  · Nasal steroid sprays  may help decrease inflammation in your nose and sinuses  · Decongestants  help reduce swelling and drain mucus in the nose and sinuses  They may help you breathe easier  · Antihistamines  help dry mucus in the nose and relieve sneezing  · Antibiotics  help treat or prevent a bacterial infection  · Take your medicine as directed  Contact your healthcare provider if you think your medicine is not helping or if you have side effects  Tell him or her if you are allergic to any medicine  Keep a list of the medicines, vitamins, and herbs you take  Include the amounts, and when and why you take them  Bring the list or the pill bottles to follow-up visits  Carry your medicine list with you in case of an emergency  Self-care:   · Rinse your sinuses  Use a sinus rinse device to rinse your nasal passages with a saline (salt water) solution or distilled water  Do not use tap water  This will help thin the mucus in your nose and rinse away pollen and dirt  It will also help reduce swelling so you can breathe normally  Ask your healthcare provider how often to do this  · Breathe in steam   Heat a bowl of water until you see steam  Lean over the bowl and make a tent over your head with a large towel  Breathe deeply for about 20 minutes  Be careful not to get too close to the steam or burn yourself  Do this 3 times a day  You can also breathe deeply when you take a hot shower  · Sleep with your head elevated    Place an extra pillow under your head before you go to sleep to help your sinuses drain  · Drink liquids as directed  Ask your healthcare provider how much liquid to drink each day and which liquids are best for you  Liquids will thin the mucus in your nose and help it drain  Avoid drinks that contain alcohol or caffeine  · Do not smoke, and avoid secondhand smoke  Nicotine and other chemicals in cigarettes and cigars can make your symptoms worse  Ask your healthcare provider for information if you currently smoke and need help to quit  E-cigarettes or smokeless tobacco still contain nicotine  Talk to your healthcare provider before you use these products  Prevent the spread of germs that cause sinusitis:  Wash your hands often with soap and water  Wash your hands after you use the bathroom, change a child's diaper, or sneeze  Wash your hands before you prepare or eat food  Follow up with your healthcare provider as directed: You may be referred to an ear, nose, and throat specialist  Write down your questions so you remember to ask them during your visits  © 2017 [facility] Information is for End User's use only and may not be sold, redistributed or otherwise used for commercial purposes  All illustrations and images included in CareNotes® are the copyrighted property of [employer]  or [employer]  The above information is an  only  It is not intended as medical advice for individual conditions or treatments  Talk to your doctor, nurse or pharmacist before following any medical regimen to see if it is safe and effective for you

## 2018-04-12 ENCOUNTER — APPOINTMENT (OUTPATIENT)
Dept: URGENT CARE | Facility: MEDICAL CENTER | Age: 28
End: 2018-04-12
Payer: OTHER MISCELLANEOUS

## 2018-04-12 PROCEDURE — 99213 OFFICE O/P EST LOW 20 MIN: CPT | Performed by: FAMILY MEDICINE

## 2018-04-20 ENCOUNTER — APPOINTMENT (OUTPATIENT)
Dept: OCCUPATIONAL MEDICINE | Facility: CLINIC | Age: 28
End: 2018-04-20
Payer: OTHER MISCELLANEOUS

## 2018-04-20 PROCEDURE — 99213 OFFICE O/P EST LOW 20 MIN: CPT | Performed by: PREVENTIVE MEDICINE

## 2018-04-24 ENCOUNTER — EVALUATION (OUTPATIENT)
Dept: PHYSICAL THERAPY | Facility: MEDICAL CENTER | Age: 28
End: 2018-04-24
Payer: OTHER MISCELLANEOUS

## 2018-04-24 DIAGNOSIS — M54.2 CERVICALGIA: Primary | ICD-10-CM

## 2018-04-24 PROCEDURE — G8982 BODY POS GOAL STATUS: HCPCS | Performed by: PHYSICAL THERAPIST

## 2018-04-24 PROCEDURE — G8981 BODY POS CURRENT STATUS: HCPCS | Performed by: PHYSICAL THERAPIST

## 2018-04-24 PROCEDURE — 97140 MANUAL THERAPY 1/> REGIONS: CPT | Performed by: PHYSICAL THERAPIST

## 2018-04-24 PROCEDURE — 97161 PT EVAL LOW COMPLEX 20 MIN: CPT | Performed by: PHYSICAL THERAPIST
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
## 2018-04-27 ENCOUNTER — APPOINTMENT (OUTPATIENT)
Dept: OCCUPATIONAL MEDICINE | Facility: CLINIC | Age: 28
End: 2018-04-27
Payer: OTHER MISCELLANEOUS

## 2018-04-27 PROCEDURE — 99213 OFFICE O/P EST LOW 20 MIN: CPT

## 2018-04-30 ENCOUNTER — OFFICE VISIT (OUTPATIENT)
Dept: PHYSICAL THERAPY | Facility: MEDICAL CENTER | Age: 28
End: 2018-04-30
Payer: OTHER MISCELLANEOUS

## 2018-04-30 DIAGNOSIS — M54.2 CERVICALGIA: Primary | ICD-10-CM

## 2018-04-30 PROCEDURE — 97110 THERAPEUTIC EXERCISES: CPT

## 2018-04-30 PROCEDURE — 97140 MANUAL THERAPY 1/> REGIONS: CPT

## 2018-04-30 NOTE — PROGRESS NOTES
Daily Note     Today's date: 2018  Patient name: Davis Ball  : 1990  MRN: [id number]  Referring provider: Angy Amaya PA-C  Dx:   Encounter Diagnosis     ICD-10-CM    1  Cervicalgia M54 2                   Subjective: Pt reports no change since IE  Notes that he is having discomfort on the left side of his neck and shoulder  "I was prescribed muscle relaxers but have yet to get them "      Objective: See treatment diary below  Precautions: none     Daily Treatment Diary      Manual                     STM L Ut, scalenes performed  performed                   R mid cervical side glides grade 2 performed                     Manual cervical distraction performed  performed                                                                         Exercise Diary                     L UT stretch 30"x3  30"x3                   L levator stretch 30"x3  30"x3                   ube bwd nv  5'                   Rhomboid stretch nv  10"x10                   Tband rows nv  GTB 2x10                   Tband extensions nv  GTB 2x10                                                                                                                                                                                                                                                                                                                                                                         Modalities                                                                                                       Assessment: Tolerated treatment well, even with newly added exercises  Patient exhibited good technique with therapeutic exercises and would benefit from continued PT  Pt demonstrated increased tightness on the right side in his UT  Plan: Continue per plan of care

## 2018-05-03 ENCOUNTER — OFFICE VISIT (OUTPATIENT)
Dept: PHYSICAL THERAPY | Facility: MEDICAL CENTER | Age: 28
End: 2018-05-03
Payer: OTHER MISCELLANEOUS

## 2018-05-03 DIAGNOSIS — M54.2 CERVICALGIA: Primary | ICD-10-CM

## 2018-05-03 PROCEDURE — 97010 HOT OR COLD PACKS THERAPY: CPT

## 2018-05-03 PROCEDURE — 97110 THERAPEUTIC EXERCISES: CPT

## 2018-05-03 PROCEDURE — 97140 MANUAL THERAPY 1/> REGIONS: CPT

## 2018-05-07 ENCOUNTER — OFFICE VISIT (OUTPATIENT)
Dept: PHYSICAL THERAPY | Facility: MEDICAL CENTER | Age: 28
End: 2018-05-07
Payer: OTHER MISCELLANEOUS

## 2018-05-07 DIAGNOSIS — M54.2 CERVICALGIA: Primary | ICD-10-CM

## 2018-05-07 PROCEDURE — 97140 MANUAL THERAPY 1/> REGIONS: CPT

## 2018-05-07 PROCEDURE — 97110 THERAPEUTIC EXERCISES: CPT

## 2018-05-07 PROCEDURE — 97010 HOT OR COLD PACKS THERAPY: CPT

## 2018-05-07 NOTE — PROGRESS NOTES
Daily Note     Today's date: 2018  Patient name: Shayy Devries  : 1990  MRN: [id number]  Referring provider: Pedro Grande PA-C  Dx:   Encounter Diagnosis     ICD-10-CM    1  Cervicalgia M54 2                   Subjective: Pt reported L side neck pain feels sig better compared to last visit, however he is now feeling it on the R side as well as the center of spine  Objective: See treatment diary below  Precautions: none     Daily Treatment Diary      Manual  4/24  4/30  5/3  5/7               IASTM b/l Ut, scalenes performed  performed  10 mins  10 mins               R mid cervical side glides grade 2 and extension mobs performed    NP  Performed by BRANDEN MCGHEE  PT               Manual cervical distraction performed  performed  5 mins  5 mins                                                                     Exercise Diary  4/24  4/30  5/3  5/7               L UT stretch 30"x3  30"x3  30"x3  30"x3               L levator stretch 30"x3  30"x3  3-"x3  30"x3               ube bwd nv  5'  5'  5'               Rhomboid stretch nv  10"x10  NP  NP               Tband rows nv  GTB 2x10  GTB 2x10  GTB 2x10               Tband extensions nv  GTB 2x10  GTB x20  GTB 2x10                                                                                                                                                                                                                                                                                                                                                                     Modalities       5/3  5/7                      MHP pre tx 10 mins  MHP pre tx 10 mins                                                                      Assessment: Tolerated treatment well, He had restriction bilaterally today (decreased tightness noted in L side compared to last visit)  Pt reported decreased pain in neck post tx  Plan: Continue per plan of care

## 2018-05-09 ENCOUNTER — OFFICE VISIT (OUTPATIENT)
Dept: PHYSICAL THERAPY | Facility: MEDICAL CENTER | Age: 28
End: 2018-05-09
Payer: OTHER MISCELLANEOUS

## 2018-05-09 DIAGNOSIS — M54.2 CERVICALGIA: Primary | ICD-10-CM

## 2018-05-09 PROCEDURE — 97110 THERAPEUTIC EXERCISES: CPT

## 2018-05-09 PROCEDURE — 97140 MANUAL THERAPY 1/> REGIONS: CPT

## 2018-05-09 NOTE — PROGRESS NOTES
Daily Note     Today's date: 2018  Patient name: Claudine Mora  : 1990  MRN: [id number]  Referring provider: Chanell Hutson PA-C  Dx:   Encounter Diagnosis     ICD-10-CM    1  Cervicalgia M54 2                   Subjective: Pt reports L side of neck only hurts him with L cervical rotation that increases L UE radic symptoms  He mostly complains of R sided neck pain as well mid CS  Objective: See treatment diary below  Precautions: none     Daily Treatment Diary      Manual  4/24  4/30  5/3  5/7  5/9             IASTM b/l Ut, scalenes, L rhomboid performed  performed  10 mins  10 mins  10 mins             R mid cervical side glides grade 2 and extension mobs performed    NP  Performed by BRANDEN MCGHEE  PT  np             Manual cervical distraction performed  performed  5 mins  5 mins  5 mins                                                                   Exercise Diary  4/24  4/30  5/3  5/7  5/9             L UT stretch 30"x3  30"x3  30"x3  30"x3  30s x3             L levator stretch 30"x3  30"x3  3-"x3  30"x3  30s x3             ube bwd nv  5'  5'  5'  5'             Rhomboid stretch nv  10"x10  NP  NP  np             Tband rows nv  GTB 2x10  GTB 2x10  GTB 2x10  GTB x20             Tband extensions nv  GTB 2x10  GTB x20  GTB 2x10  GTB x20              TS extension over foam roll         NV if able                                                                                                                                                                                                                                                                                                                                           Modalities       5/3  5/7  5/9                    MHP pre tx 10 mins  MHP pre tx 10 mins  MHP pre tx 10 mins                                                                    Assessment: Tolerated treatment well  Most restriction present today over R UT and L rhomboid   He continues to c/o lower cervical spine pain  Plan to pt w/ TS extension over foam if able NV  Plan: Continue per plan of care

## 2018-05-11 ENCOUNTER — APPOINTMENT (OUTPATIENT)
Dept: OCCUPATIONAL MEDICINE | Facility: CLINIC | Age: 28
End: 2018-05-11
Payer: OTHER MISCELLANEOUS

## 2018-05-11 PROCEDURE — 99213 OFFICE O/P EST LOW 20 MIN: CPT

## 2018-05-15 ENCOUNTER — OFFICE VISIT (OUTPATIENT)
Dept: PHYSICAL THERAPY | Facility: MEDICAL CENTER | Age: 28
End: 2018-05-15
Payer: OTHER MISCELLANEOUS

## 2018-05-15 DIAGNOSIS — M54.2 CERVICALGIA: Primary | ICD-10-CM

## 2018-05-15 PROCEDURE — 97112 NEUROMUSCULAR REEDUCATION: CPT | Performed by: PHYSICAL THERAPIST

## 2018-05-15 PROCEDURE — 97140 MANUAL THERAPY 1/> REGIONS: CPT | Performed by: PHYSICAL THERAPIST

## 2018-05-15 PROCEDURE — 97110 THERAPEUTIC EXERCISES: CPT | Performed by: PHYSICAL THERAPIST

## 2018-05-15 NOTE — PROGRESS NOTES
Daily Note     Today's date: 5/15/2018  Patient name: Monster Bella  : 1990  MRN: [id number]  Referring provider: Cindi Messina PA-C  Dx:   Encounter Diagnosis     ICD-10-CM    1  Cervicalgia M54 2                   Subjective: Pt had f/u w/ MD and was provided w/ script to continue PT  Feels that pain has decreased since initiating PT  Today most pain is located in R upper cervical region      Objective: See treatment diary below  Precautions: none     Daily Treatment Diary      Manual  4/24  4/30  5/3  5/7  5/9  5/15           IASTM b/l Ut, scalenes, L rhomboid performed  performed  10 mins  10 mins  10 mins  15'           R mid cervical side glides grade 2 and extension mobs performed    NP  Performed by BRANDEN MCGHEE  PT  np  5'           Manual cervical distraction performed  performed  5 mins  5 mins  5 mins  5'                                                                 Exercise Diary  4/24  4/30  5/3  5/7  5/9  5/15           L UT stretch 30"x3  30"x3  30"x3  30"x3  30s x3  30"x3 B           L levator stretch 30"x3  30"x3  3-"x3  30"x3  30s x3  30"x3 B           ube bwd nv  5'  5'  5'  5'  5'           Rhomboid stretch nv  10"x10  NP  NP  np  np           Tband rows nv  GTB 2x10  GTB 2x10  GTB 2x10  GTB x20  GTB 5"x20           Tband extensions nv  GTB 2x10  GTB x20  GTB 2x10  GTB x20  GTB 5"x20            TS extension over foam roll         NV if able              B shoulder ER            GTB x20            B shoulder horizontal abd            GTB x20                                                                                                                                                                                                                                                                                         Modalities       5/3  5/7  5/9                    MHP pre tx 10 mins  MHP pre tx 10 mins  MHP pre tx 10 mins                                                                  Assessment: Tolerated treatment well  Restrictions persist over B UT, L scalenes and rhomboid  Progressed with Tband ER and horizontal abduction w/ good tolerance  Plan: Continue per plan of care

## 2018-05-17 ENCOUNTER — APPOINTMENT (OUTPATIENT)
Dept: PHYSICAL THERAPY | Facility: MEDICAL CENTER | Age: 28
End: 2018-05-17
Payer: OTHER MISCELLANEOUS

## 2018-05-22 ENCOUNTER — OFFICE VISIT (OUTPATIENT)
Dept: PHYSICAL THERAPY | Facility: MEDICAL CENTER | Age: 28
End: 2018-05-22
Payer: OTHER MISCELLANEOUS

## 2018-05-22 ENCOUNTER — TRANSCRIBE ORDERS (OUTPATIENT)
Dept: NEUROSURGERY | Facility: CLINIC | Age: 28
End: 2018-05-22

## 2018-05-22 DIAGNOSIS — M54.2 NECK PAIN: Primary | ICD-10-CM

## 2018-05-22 DIAGNOSIS — M54.2 CERVICALGIA: Primary | ICD-10-CM

## 2018-05-22 PROCEDURE — 97110 THERAPEUTIC EXERCISES: CPT

## 2018-05-22 PROCEDURE — 97140 MANUAL THERAPY 1/> REGIONS: CPT

## 2018-05-22 NOTE — PROGRESS NOTES
Daily Note     Today's date: 2018  Patient name: Tanner Arora  : 1990  MRN: [id number]  Referring provider: Dioni Mares PA-C  Dx:   Encounter Diagnosis     ICD-10-CM    1  Cervicalgia M54 2                   Subjective: Pt reported pre tx that "pain is the same"  He claims at this point he only has discomfort in upper CS with extension  He also complains of symptoms in LUE with cervical extension and L rotation  No limitation throughout the day secondary to pain  Objective: See treatment diary below  Precautions: none     Daily Treatment Diary      Manual  4/24  4/30  5/3  5/7  5/9  5/15  5/22         IASTM b/l Ut, scalenes, L rhomboid performed  performed  10 mins  10 mins  10 mins  15'  NP this date         R mid cervical side glides grade 2 and extension mobs performed    NP  Performed by BRANDEN MCGHEE  PT  np  5'  NP         Manual cervical distraction performed  performed  5 mins  5 mins  5 mins  5'  5 min          Soft Tissue work to b/l upper CS paraspinals in supine              10 min                                       Exercise Diary  4/24  4/30  5/3  5/7  5/9  5/15 5/22         L UT stretch R only 30"x3  30"x3  30"x3  30"x3  30s x3  30"x3 B  30"x3         L levator stretch 30"x3  30"x3  3-"x3  30"x3  30s x3  30"x3 B  30"x3         ube bwd nv  5'  5'  5'  5'  5'  5' L1 5         Rhomboid stretch nv  10"x10  NP  NP  np  np  NP         Tband rows nv  GTB 2x10  GTB 2x10  GTB 2x10  GTB x20  GTB 5"x20  GTB 5"x20         Tband extensions nv  GTB 2x10  GTB x20  GTB 2x10  GTB x20  GTB 5"x20  GTB 5"x20          CTJ extension over peanut          NV    2 min          B shoulder ER            GTB x20  GTB x20          B shoulder horizontal abd            GTB x20  GTB x20                                                                                                                                                                                                                                                                                       Modalities       5/3  5/7  5/9                    MHP pre tx 10 mins  MHP pre tx 10 mins  MHP pre tx 10 mins                                                                    Assessment: Pt no longer complains of pain or tightness in UT region therefore held IASTM  Focused tissue work on upper CS paraspinals w/ good tolerance  Pt reports relief post MT but overall denied improvement  He can reproduce symptoms with cervical extension and L rotation  Added CTJ extension over peanut today - assess response NV  Plan: Continue per plan of care

## 2018-05-24 ENCOUNTER — EVALUATION (OUTPATIENT)
Dept: PHYSICAL THERAPY | Facility: MEDICAL CENTER | Age: 28
End: 2018-05-24
Payer: OTHER MISCELLANEOUS

## 2018-05-24 DIAGNOSIS — M54.2 CERVICALGIA: Primary | ICD-10-CM

## 2018-05-24 PROCEDURE — 97110 THERAPEUTIC EXERCISES: CPT | Performed by: PHYSICAL THERAPIST

## 2018-05-24 PROCEDURE — 97140 MANUAL THERAPY 1/> REGIONS: CPT | Performed by: PHYSICAL THERAPIST

## 2018-05-24 PROCEDURE — G8982 BODY POS GOAL STATUS: HCPCS | Performed by: PHYSICAL THERAPIST

## 2018-05-24 PROCEDURE — G8981 BODY POS CURRENT STATUS: HCPCS | Performed by: PHYSICAL THERAPIST

## 2018-05-24 NOTE — PROGRESS NOTES
PT Re-Evaluation     Today's date: 2018  Patient name: Sylvester Pierre  : 1990  MRN: [id number]  Referring provider: Tien Coelho PA-C  Dx:   Encounter Diagnosis     ICD-10-CM    1  Cervicalgia M54 2                   Assessment  Impairments: abnormal or restricted ROM, abnormal movement and pain with function    Assessment details: Sylvester Pierre has attended    8  visits since initiating PT and has demonstrated overall improvement  Mobility and strength has improved with decreased reports of pain  Sylvester Pierre has demonstrated an improvement in function as well  Currently, he has made steady progress towards his goals, but continue to remain limited with L cervical rotation and extension  Has been unable to RTW full duty as   At this time, continued physical therapy is recommended in order to address their remaining impairments in effort to further improve function  Has appt scheduled w/ surgeon next week  Understanding of Dx/Px/POC: good   Prognosis: good    Goals  Short Term Goals:   1  Pain decreased 2 ratings in 4 weeks - MET  2   ROM increased 10* in 4 weeks - PARTIALLY MET    Long Term Goals:   1  ADLS/IADLS in related activities improved to maximal level in 8 weeks - PARTIALLY MET  2  Work performance improved to maximal level in 8 weeks - NOT MET  3  Posture is improved to maximal level in 8 weeks  - PARTIALLY MET  4  [place] with HEP in 8 weeks   - PARTIALLY MET    Plan  Patient would benefit from: skilled PT  Planned modality interventions: cryotherapy  Planned therapy interventions: stretching, strengthening, postural training, patient education, neuromuscular re-education, manual therapy, home exercise program, functional ROM exercises, flexibility and therapeutic exercise  Frequency: 2x week  Duration in weeks: 8  Treatment plan discussed with: patient        Subjective Evaluation    History of Present Illness  Mechanism of injury: Pt reports improvement since beginning PT  Pain persists w/ L cervical rotation and extension  Prolonged sitting exacerbates symptoms at times  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  Stiffness present first thing in the morning and decreases w/ shower and stretches  No sleep disturbance  Pain  At best pain ratin  At worst pain ratin    Patient Goals  Patient goals for therapy: decreased pain, increased motion, increased strength, return to work and independence with ADLs/IADLs          Objective     Static Posture     Comments  Seated slouched posture persists; able to correct w/ cueing  Palpation     Additional Palpation Details  Tightness persists over L sub-occipitals  Tightness in upper thoracic segments      Active Range of Motion   Cervical/Thoracic Spine   Cervical    Flexion: 56 degrees   Extension: 42 degrees   Left lateral flexion: 21 degrees   Right lateral flexion: 36 degrees   Left rotation: 60 degrees   Right rotation: 72 degrees   Left Shoulder   Normal active range of motion    Right Shoulder   Normal active range of motion    Strength/Myotome Testing     Left Shoulder   Normal muscle strength    Right Shoulder   Normal muscle strength      Flowsheet Rows      Most Recent Value   PT/OT G-Codes   Current Score  60   Projected Score  66   FOTO information reviewed  Yes   Assessment Type  Re-evaluation   G code set  Changing & Maintaining Body Position   Changing and Maintaining Body Position Current Status ()  CK   Changing and Maintaining Body Position Goal Status ()  CJ          Precautions: none    Daily Treatment Diary     Manual              STM L cervical musculature, sub-occipitals  Manual cervical distraction 10'            Grade 2-3 upper thoracic PA mobs 5'                                                       Exercise Diary              UBE bwd 5'            B levator stretch 30"x3            L UT stretch 30"x3            TB rows GTB  5"x20 TB extensions GTB 5"x20            B shoulder ER GTB x20            B shoulder horizontal abduction GTB x20                                                                                                                                                                                         Modalities

## 2018-05-25 ENCOUNTER — APPOINTMENT (OUTPATIENT)
Dept: OCCUPATIONAL MEDICINE | Facility: CLINIC | Age: 28
End: 2018-05-25
Payer: OTHER MISCELLANEOUS

## 2018-05-25 PROCEDURE — 99213 OFFICE O/P EST LOW 20 MIN: CPT

## 2018-05-29 ENCOUNTER — OFFICE VISIT (OUTPATIENT)
Dept: PHYSICAL THERAPY | Facility: MEDICAL CENTER | Age: 28
End: 2018-05-29
Payer: OTHER MISCELLANEOUS

## 2018-05-29 DIAGNOSIS — M54.2 CERVICALGIA: Primary | ICD-10-CM

## 2018-05-29 PROCEDURE — 97110 THERAPEUTIC EXERCISES: CPT | Performed by: PHYSICAL THERAPIST

## 2018-05-29 PROCEDURE — 97140 MANUAL THERAPY 1/> REGIONS: CPT | Performed by: PHYSICAL THERAPIST

## 2018-05-29 PROCEDURE — 97112 NEUROMUSCULAR REEDUCATION: CPT | Performed by: PHYSICAL THERAPIST

## 2018-05-29 NOTE — PROGRESS NOTES
Daily Note     Today's date: 2018  Patient name: Yeimi Steve  : 1990  MRN: [id number]  Referring provider: Sanjeev Myanard PA-C  Dx:   Encounter Diagnosis     ICD-10-CM    1  Cervicalgia M54 2                   Subjective: Pt reports feeling OK today  Mild stiffness upon awakening the past few days  Decreased pain following addition of thoracic mobs last visit  Objective: See treatment diary below  Precautions none    Specialty Daily Treatment Diary     Manual         Manual cervical distraction 5'       STM B UT, sub-occipitals 10'       Upper thoracic PA mobs grade 3-4 5'                           Exercise Diary         UBE bwd 5'       TB rows GTB 5"x20       TB extensions GTB 5"x20       B seated shoulder ER GTB x20       B seated shoulder horizontal abd GTB x20       B levator stretch 30"x3       L UT stretch 30"x3                                                                                                                   Modalities                                          Assessment: Tolerated treatment well  Patient exhibited good technique with therapeutic exercises and would benefit from continued PT      Plan: Continue per plan of care

## 2018-05-31 ENCOUNTER — OFFICE VISIT (OUTPATIENT)
Dept: NEUROSURGERY | Facility: CLINIC | Age: 28
End: 2018-05-31
Payer: OTHER MISCELLANEOUS

## 2018-05-31 ENCOUNTER — APPOINTMENT (OUTPATIENT)
Dept: PHYSICAL THERAPY | Facility: MEDICAL CENTER | Age: 28
End: 2018-05-31
Payer: OTHER MISCELLANEOUS

## 2018-05-31 VITALS
RESPIRATION RATE: 16 BRPM | HEART RATE: 93 BPM | TEMPERATURE: 97.9 F | DIASTOLIC BLOOD PRESSURE: 78 MMHG | BODY MASS INDEX: 26.77 KG/M2 | WEIGHT: 187 LBS | HEIGHT: 70 IN | SYSTOLIC BLOOD PRESSURE: 138 MMHG

## 2018-05-31 DIAGNOSIS — M50.10 HERNIATION OF CERVICAL INTERVERTEBRAL DISC WITH RADICULOPATHY: Primary | ICD-10-CM

## 2018-05-31 DIAGNOSIS — M54.2 NECK PAIN: ICD-10-CM

## 2018-05-31 PROCEDURE — 99244 OFF/OP CNSLTJ NEW/EST MOD 40: CPT | Performed by: NEUROLOGICAL SURGERY

## 2018-05-31 RX ORDER — ACETAMINOPHEN 500 MG
1000 TABLET ORAL AS NEEDED
COMMUNITY
End: 2021-09-20 | Stop reason: ALTCHOICE

## 2018-05-31 NOTE — LETTER
May 31, 2018     Carley Noble MD  [address]    Patient: Adeola Singh   YOB: 1990   Date of Visit: 5/31/2018       Dear Dr Marcus Maldondao:    Thank you for referring Adeola Singh to me for evaluation  Below are my notes for this consultation  If you have questions, please do not hesitate to call me  I look forward to following your patient along with you  Sincerely,        Alison Infante MD        CC: Justin Ramirez, Dawit Gomez MD  5/31/2018  2:59 PM  Sign at close encounter  Office Note - Neurosurgery   Adeola Singh 24 y o  male MRN: [id number]      Assessment:    Patient is gradually improving  53-year-old gentleman with cervicalgia and left C6 radiculopathy secondary to C6-7 disc herniation  Fortunately his symptoms are improving and I anticipated will continue to improve over time  I asked him to discuss oral steroids with his occupational medicine specialist   Alternatively he could try a course of NSAIDs  He should continue with physical therapy  I will refer him to pain management to discuss possible epidural steroid injections  I explained that surgery would be reserved in the event that radicular symptoms persist and impact on quality of life despite exhausting nonsurgical pain management strategies  I also reviewed the signs and symptoms of progressive cervical radiculopathy with him today and asked him to contact my office should any concerns arise  Otherwise, he will follow up on a p r n  basis  History, physical examination and diagnostic tests were reviewed and questions answered  Diagnosis, care plan and treatment options were discussed  The patient understand instructions and will follow up as directed      Plan:    Follow-up: prn    Problem List Items Addressed This Visit        Musculoskeletal and Integument    Herniation of cervical intervertebral disc with radiculopathy - Primary      Other Visit Diagnoses     Neck pain        Relevant Orders Ambulatory referral to Pain Management          Subjective/Objective     Chief Complaint    Neck pain  HPI    65-year-old right-hand-dominant  who sustained a work related injury on April 2nd, 2018  He developed neck stiffness and pain later that day  He also began to notice that when he extends his neck and looks to the left he will get pain which radiates into the left scapula and triceps  If he keeps his neck and that position he will have some numbness and tingling in his middle digit on the left  However when his neck is in a relatively neutral position he has no pain, weakness or numbness in his left arm  He denies any pain, weakness or numbness in his right arm or legs  He denies any difficulties with bowel bladder function or changing perineal sensation  He rates his neck pain as 4 to 7/10 pain and is centered at the cervical thoracic junction and worse 1st thing in the morning  He denies any difficulties with fine motor tasks, balance or handwriting  Physical therapy has been very helpful with the pain  Muscle relaxers were helpful with the stiffness as well  Tylenol is not helpful  He has not tried oral steroids or NSAIDs  He has not tried epidural steroid injections  He presents today with an MRI of the cervical spine  ROS    Review of Systems   Constitutional: Positive for fatigue  HENT: Negative  Eyes: Negative  Respiratory: Negative  Cardiovascular: Negative  Gastrointestinal: Negative  Endocrine: Negative  Genitourinary: Negative  Musculoskeletal: Positive for neck pain (neck pain radiates into left shoulder down left arm, only radiates down arm with ROM )  Skin: Negative  Allergic/Immunologic: Negative  Neurological: Positive for numbness (Left arm and hand )  Negative for dizziness, seizures, syncope, weakness and headaches  Hematological: Negative  Psychiatric/Behavioral: Negative          Family History    Family History   Problem Relation Age of Onset    No Known Problems Mother     No Known Problems Father        Social History    Social History     Social History    Marital status: Single     Spouse name: N/A    Number of children: N/A    Years of education: N/A     Occupational History    Not on file  Social History Main Topics    Smoking status: Never Smoker    Smokeless tobacco: Former User     Types: Chew      Comment: Quit Jan 2016    Alcohol use Yes      Comment: social    Drug use: No    Sexual activity: Not on file     Other Topics Concern    Not on file     Social History Narrative    No narrative on file       Past Medical History    History reviewed  No pertinent past medical history  Surgical History    Past Surgical History:   Procedure Laterality Date    SHOULDER SURGERY  03/27/2015       Medications      Current Outpatient Prescriptions:     acetaminophen (TYLENOL) 500 mg tablet, Take 1,000 mg by mouth as needed for mild pain, Disp: , Rfl:     ADDERALL XR, 25MG, 25 MG 24 hr capsule, 25 mg every morning, Disp: , Rfl: 0    Lactobacillus-Inulin ([address]) CAPS, Bid while on antibiotics and for  1 weekoff, Disp: 40 capsule, Rfl: 0    Allergies    No Known Allergies    The following portions of the patient's history were reviewed and updated as appropriate: allergies, current medications, past family history, past medical history, past social history, past surgical history and problem list     Investigations    I personally reviewed the MRI results with the patient:    MRI of the cervical spine without contrast dated April 24, 2018  Mild straightening of cervical lordosis  Left C6-7 disc herniation with impingement on the exiting nerve root  No other areas of significant neural compression  Intrathecal contents unremarkable  Spinal cord is normal in signal   Visualized posterior fossa structures are unremarkable      Physical Exam    Vitals:  Blood pressure 138/78, pulse 93, temperature 97 9 °F (36 6 °C), resp  rate 16, height 5' 10" (1 778 m), weight 84 8 kg (187 lb)  ,Body mass index is 26 83 kg/m²  Physical Exam   Constitutional: He appears well-developed and well-nourished  Neck: Normal range of motion  Extension and axial rotation to the left produces pain in the left shoulder blade and triceps  Musculoskeletal:        Cervical back: He exhibits tenderness  He exhibits normal range of motion  Neurological: He has normal strength  He has a normal Romberg Test and a normal Tandem Gait Test  Gait normal    Reflex Scores:       Tricep reflexes are 1+ on the right side and 1+ on the left side  Bicep reflexes are 1+ on the right side and 1+ on the left side  Patellar reflexes are 1+ on the right side and 1+ on the left side  Skin: Skin is dry  Psychiatric: He has a normal mood and affect  His behavior is normal  Judgment and thought content normal      Neurologic Exam     Motor Exam   Muscle bulk: normal  Overall muscle tone: normal    Strength   Strength 5/5 throughout       Sensory Exam   Light touch normal    Right arm vibration: normal  Left arm vibration: normal  Pinprick normal      Gait, Coordination, and Reflexes     Gait  Gait: normal    Coordination   Romberg: negative  Tandem walking coordination: normal    Reflexes   Right biceps: 1+  Left biceps: 1+  Right triceps: 1+  Left triceps: 1+  Right patellar: 1+  Left patellar: 1+  Right Pandey: absent  Left Pandey: absent  Right ankle clonus: absent  Left ankle clonus: absent

## 2018-05-31 NOTE — PROGRESS NOTES
Office Note - Neurosurgery   Era Samantha 24 y o  male MRN: [id number]      Assessment:    Patient is gradually improving  53-year-old gentleman with cervicalgia and left C6 radiculopathy secondary to C6-7 disc herniation  Fortunately his symptoms are improving and I anticipated will continue to improve over time  I asked him to discuss oral steroids with his occupational medicine specialist   Alternatively he could try a course of NSAIDs  He should continue with physical therapy  I will refer him to pain management to discuss possible epidural steroid injections  I explained that surgery would be reserved in the event that radicular symptoms persist and impact on quality of life despite exhausting nonsurgical pain management strategies  I also reviewed the signs and symptoms of progressive cervical radiculopathy with him today and asked him to contact my office should any concerns arise  Otherwise, he will follow up on a p r n  basis  History, physical examination and diagnostic tests were reviewed and questions answered  Diagnosis, care plan and treatment options were discussed  The patient understand instructions and will follow up as directed  Plan:    Follow-up: prn    Problem List Items Addressed This Visit        Musculoskeletal and Integument    Herniation of cervical intervertebral disc with radiculopathy - Primary      Other Visit Diagnoses     Neck pain        Relevant Orders    Ambulatory referral to Pain Management          Subjective/Objective     Chief Complaint    Neck pain  HPI    53-year-old right-hand-dominant  who sustained a work related injury on April 2nd, 2018  He developed neck stiffness and pain later that day  He also began to notice that when he extends his neck and looks to the left he will get pain which radiates into the left scapula and triceps    If he keeps his neck and that position he will have some numbness and tingling in his middle digit on the left  However when his neck is in a relatively neutral position he has no pain, weakness or numbness in his left arm  He denies any pain, weakness or numbness in his right arm or legs  He denies any difficulties with bowel bladder function or changing perineal sensation  He rates his neck pain as 4 to 7/10 pain and is centered at the cervical thoracic junction and worse 1st thing in the morning  He denies any difficulties with fine motor tasks, balance or handwriting  Physical therapy has been very helpful with the pain  Muscle relaxers were helpful with the stiffness as well  Tylenol is not helpful  He has not tried oral steroids or NSAIDs  He has not tried epidural steroid injections  He presents today with an MRI of the cervical spine  ROS    Review of Systems   Constitutional: Positive for fatigue  HENT: Negative  Eyes: Negative  Respiratory: Negative  Cardiovascular: Negative  Gastrointestinal: Negative  Endocrine: Negative  Genitourinary: Negative  Musculoskeletal: Positive for neck pain (neck pain radiates into left shoulder down left arm, only radiates down arm with ROM )  Skin: Negative  Allergic/Immunologic: Negative  Neurological: Positive for numbness (Left arm and hand )  Negative for dizziness, seizures, syncope, weakness and headaches  Hematological: Negative  Psychiatric/Behavioral: Negative  Family History    Family History   Problem Relation Age of Onset    No Known Problems Mother     No Known Problems Father        Social History    Social History     Social History    Marital status: Single     Spouse name: N/A    Number of children: N/A    Years of education: N/A     Occupational History    Not on file       Social History Main Topics    Smoking status: Never Smoker    Smokeless tobacco: Former User     Types: Chew      Comment: Quit Jan 2016    Alcohol use Yes      Comment: social    Drug use: No    Sexual activity: Not on file     Other Topics Concern    Not on file     Social History Narrative    No narrative on file       Past Medical History    History reviewed  No pertinent past medical history  Surgical History    Past Surgical History:   Procedure Laterality Date    SHOULDER SURGERY  03/27/2015       Medications      Current Outpatient Prescriptions:     acetaminophen (TYLENOL) 500 mg tablet, Take 1,000 mg by mouth as needed for mild pain, Disp: , Rfl:     ADDERALL XR, 25MG, 25 MG 24 hr capsule, 25 mg every morning, Disp: , Rfl: 0    Lactobacillus-Inulin ([address]) CAPS, Bid while on antibiotics and for  1 weekoff, Disp: 40 capsule, Rfl: 0    Allergies    No Known Allergies    The following portions of the patient's history were reviewed and updated as appropriate: allergies, current medications, past family history, past medical history, past social history, past surgical history and problem list     Investigations    I personally reviewed the MRI results with the patient:    MRI of the cervical spine without contrast dated April 24, 2018  Mild straightening of cervical lordosis  Left C6-7 disc herniation with impingement on the exiting nerve root  No other areas of significant neural compression  Intrathecal contents unremarkable  Spinal cord is normal in signal   Visualized posterior fossa structures are unremarkable  Physical Exam    Vitals:  Blood pressure 138/78, pulse 93, temperature 97 9 °F (36 6 °C), resp  rate 16, height 5' 10" (1 778 m), weight 84 8 kg (187 lb)  ,Body mass index is 26 83 kg/m²  Physical Exam   Constitutional: He appears well-developed and well-nourished  Neck: Normal range of motion  Extension and axial rotation to the left produces pain in the left shoulder blade and triceps  Musculoskeletal:        Cervical back: He exhibits tenderness  He exhibits normal range of motion  Neurological: He has normal strength   He has a normal Romberg Test and a normal Tandem Gait Test  Gait normal    Reflex Scores:       Tricep reflexes are 1+ on the right side and 1+ on the left side  Bicep reflexes are 1+ on the right side and 1+ on the left side  Patellar reflexes are 1+ on the right side and 1+ on the left side  Skin: Skin is dry  Psychiatric: He has a normal mood and affect  His behavior is normal  Judgment and thought content normal      Neurologic Exam     Motor Exam   Muscle bulk: normal  Overall muscle tone: normal    Strength   Strength 5/5 throughout       Sensory Exam   Light touch normal    Right arm vibration: normal  Left arm vibration: normal  Pinprick normal      Gait, Coordination, and Reflexes     Gait  Gait: normal    Coordination   Romberg: negative  Tandem walking coordination: normal    Reflexes   Right biceps: 1+  Left biceps: 1+  Right triceps: 1+  Left triceps: 1+  Right patellar: 1+  Left patellar: 1+  Right Pandey: absent  Left Pandey: absent  Right ankle clonus: absent  Left ankle clonus: absent

## 2018-06-05 ENCOUNTER — OFFICE VISIT (OUTPATIENT)
Dept: PHYSICAL THERAPY | Facility: MEDICAL CENTER | Age: 28
End: 2018-06-05
Payer: OTHER MISCELLANEOUS

## 2018-06-05 DIAGNOSIS — M54.2 CERVICALGIA: Primary | ICD-10-CM

## 2018-06-05 PROCEDURE — 97110 THERAPEUTIC EXERCISES: CPT

## 2018-06-05 PROCEDURE — 97140 MANUAL THERAPY 1/> REGIONS: CPT

## 2018-06-05 NOTE — PROGRESS NOTES
Daily Note     Today's date: 2018  Patient name: Kiki Nicole  : 1990  MRN: [id number]  Referring provider: Ignacia Smith PA-C  Dx:   Encounter Diagnosis     ICD-10-CM    1  Cervicalgia M54 2                   Subjective: Pt reports he is feeling a lot better  He denies pain at rest or radic symptoms  He only has mild pain with extension and L rotation combined  Pt saw neurosurgon who recommended pain mgt for injections if needed otherwise to cont w/ PT  Objective: See treatment diary below  Precautions none    Specialty Daily Treatment Diary     Manual        Manual cervical distraction 5' 5'      STM B UT, sub-occipitals 10' 10'      Upper thoracic PA mobs grade 3-4 5' np                          Exercise Diary        UBE bwd 5' 5'      TB rows GTB 5"x20 GTB 5"X20      TB extensions GTB 5"x20 gtb 5"X20      B seated shoulder ER GTB x20 GTB x20      B seated shoulder horizontal abd GTB x20 GTB x20      B levator stretch 30"x3 30"x3      L UT stretch 30"x3 30"x3      TS extension over foam roll  10"x10                                                                                                          Modalities                                          Assessment: Tolerated treatment well  Patient exhibited good technique with therapeutic exercises and would benefit from continued PT  Plan to start work simulating ex's nv if able in preparation for d/c and RTW  Plan: Continue per plan of care

## 2018-06-07 ENCOUNTER — APPOINTMENT (OUTPATIENT)
Dept: PHYSICAL THERAPY | Facility: MEDICAL CENTER | Age: 28
End: 2018-06-07
Payer: OTHER MISCELLANEOUS

## 2018-06-12 ENCOUNTER — OFFICE VISIT (OUTPATIENT)
Dept: PHYSICAL THERAPY | Facility: MEDICAL CENTER | Age: 28
End: 2018-06-12
Payer: OTHER MISCELLANEOUS

## 2018-06-12 DIAGNOSIS — M54.2 CERVICALGIA: Primary | ICD-10-CM

## 2018-06-12 PROCEDURE — 97140 MANUAL THERAPY 1/> REGIONS: CPT

## 2018-06-12 PROCEDURE — 97110 THERAPEUTIC EXERCISES: CPT

## 2018-06-12 NOTE — PROGRESS NOTES
Daily Note     Today's date: 2018  Patient name: Osmany Summers  : 1990  MRN: [id number]  Referring provider: Darling Herbert PA-C  Dx:   Encounter Diagnosis     ICD-10-CM    1  Cervicalgia M54 2                   Subjective: Pt had increased tightness in upper neck region over the weekend for unknown reason  Objective: See treatment diary below  Precautions none    Specialty Daily Treatment Diary     Manual       Manual cervical distraction 5' 5' 5'     STM B UT, sub-occipitals 10' 10' 10'     Upper thoracic PA mobs grade 3-4 5' np                          Exercise Diary       UBE bwd 5' 5' 5'     TB rows GTB 5"x20 GTB 5"X20 BTB 5"x20     TB extensions GTB 5"x20 gtb 5"X20 BTB 5"x20     B seated shoulder ER GTB x20 GTB x20 GTB x20     B seated shoulder horizontal abd GTB x20 GTB x20 GTB x20     B levator stretch 30"x3 30"x3 30"x3     L UT stretch 30"x3 30"x3 30"x3     TS extension over foam roll  10"x10 10"x10     Push Ups   nv     CC Rows   nv     Lat Pull Downs   nv     Prone Y, T, I   nv                                                                         Modalities                                          Assessment: Tolerated treatment well  Patient exhibited good technique with therapeutic exercises and would benefit from continued PT  Progress pt as charted nv  Pt had mild restriction in upper LS today  Decreased tightness noted post tx  Plan: Continue per plan of care

## 2018-06-13 ENCOUNTER — TELEPHONE (OUTPATIENT)
Dept: PAIN MEDICINE | Facility: CLINIC | Age: 28
End: 2018-06-13

## 2018-06-14 ENCOUNTER — OFFICE VISIT (OUTPATIENT)
Dept: PHYSICAL THERAPY | Facility: MEDICAL CENTER | Age: 28
End: 2018-06-14
Payer: OTHER MISCELLANEOUS

## 2018-06-14 DIAGNOSIS — M54.2 CERVICALGIA: Primary | ICD-10-CM

## 2018-06-14 PROCEDURE — 97110 THERAPEUTIC EXERCISES: CPT

## 2018-06-14 PROCEDURE — 97140 MANUAL THERAPY 1/> REGIONS: CPT

## 2018-06-14 NOTE — PROGRESS NOTES
Daily Note     Today's date: 2018  Patient name: Shazia Hinojosa  : 1990  MRN: [id number]  Referring provider: Evans Gomez PA-C  Dx:   Encounter Diagnosis     ICD-10-CM    1  Cervicalgia M54 2                   Subjective: No complaints of pain today  He states neck has been feeling good  Objective: See treatment diary below  Precautions none    Specialty Daily Treatment Diary     Manual      Manual cervical distraction 5' 5' 5' 5'    STM B UT, sub-occipitals 10' 10' 10' 5'    Upper thoracic PA mobs grade 3-4 5' np                          Exercise Diary      UBE bwd 5' 5' 5' 5'    TB rows GTB 5"x20 GTB 5"X20 BTB 5"x20 BTB 5"x20    TB extensions GTB 5"x20 gtb 5"X20 BTB 5"x20 GTB 5"x20    B seated shoulder ER GTB x20 GTB x20 GTB x20 GTB x20    B seated shoulder horizontal abd GTB x20 GTB x20 GTB x20 GTB x20    B levator stretch 30"x3 30"x3 30"x3 30"x3    L UT stretch 30"x3 30"x3 30"x3 30"x3    TS extension over foam roll  10"x10 10"x10 NP            CC Rows   nv 50#x30    Lat Pull Downs   nv 50#x30    Prone Y, T, I   nv 5"x15 ea                                                                        Modalities                                          Assessment: Tolerated treatment well  Patient exhibited good technique with therapeutic exercises and would benefit from continued PT  Progressed pt w/ TE as charted today  He was challenged w/ prone series  Plan: Continue per plan of care

## 2018-06-19 ENCOUNTER — OFFICE VISIT (OUTPATIENT)
Dept: PHYSICAL THERAPY | Facility: MEDICAL CENTER | Age: 28
End: 2018-06-19
Payer: OTHER MISCELLANEOUS

## 2018-06-19 DIAGNOSIS — M54.2 CERVICALGIA: Primary | ICD-10-CM

## 2018-06-19 PROCEDURE — G8984 CARRY CURRENT STATUS: HCPCS

## 2018-06-19 PROCEDURE — 97140 MANUAL THERAPY 1/> REGIONS: CPT

## 2018-06-19 PROCEDURE — G8985 CARRY GOAL STATUS: HCPCS

## 2018-06-19 PROCEDURE — 97110 THERAPEUTIC EXERCISES: CPT

## 2018-06-19 NOTE — PROGRESS NOTES
Daily Note     Today's date: 2018  Patient name: Osmany Summers  : 1990  MRN: [id number]  Referring provider: Darling Herbert PA-C  Dx:   Encounter Diagnosis     ICD-10-CM    1  Cervicalgia M54 2        Start Time:   Stop Time:   Total time in clinic (min): 45 minutes    Subjective: Pt slept on a couch last night and reported increased pain in neck this morning with extension and L rotation  Objective: See treatment diary below  Precautions none    Specialty Daily Treatment Diary     Manual     Manual cervical distraction 5' 5' 5' 5' 5'   STM B UT, sub-occipitals 10' 10' 10' 5' 5'   Upper thoracic PA mobs grade 3-4 5' np   np                       Exercise Diary     UBE bwd 5' 5' 5' 5' 5' L2   TB rows GTB 5"x20 GTB 5"X20 BTB 5"x20 BTB 5"x20 NP   TB extensions GTB 5"x20 gtb 5"X20 BTB 5"x20 GTB 5"x20 NP   B seated shoulder ER GTB x20 GTB x20 GTB x20 GTB x20 GTB x20   B seated shoulder horizontal abd GTB x20 GTB x20 GTB x20 GTB x20 GTB x20   B levator stretch 30"x3 30"x3 30"x3 30"x3 30"x3   L UT stretch 30"x3 30"x3 30"x3 30"x3 30"x3   TS extension over foam roll  10"x10 10"x10 NP 10"x10           CC Rows   nv 50#x30 50# x30   Lat Pull Downs   nv 50#x30 50# x30   Prone Y, T, I   nv 5"x15 ea 5"x15 ea   Doorway Pec Stretch      30"x3                                                               Modalities                                          Assessment: Tolerated treatment well  Patient exhibited good technique with therapeutic exercises and would benefit from continued PT  Progressed pt w/ TE as charted today  Good form noted throughout  Added doorway pec stretch today to address sig tightness due to rounded shoulders  Plan: Continue per plan of care

## 2018-06-26 ENCOUNTER — APPOINTMENT (OUTPATIENT)
Dept: PHYSICAL THERAPY | Facility: MEDICAL CENTER | Age: 28
End: 2018-06-26
Payer: OTHER MISCELLANEOUS

## 2018-06-28 ENCOUNTER — OFFICE VISIT (OUTPATIENT)
Dept: PHYSICAL THERAPY | Facility: MEDICAL CENTER | Age: 28
End: 2018-06-28
Payer: OTHER MISCELLANEOUS

## 2018-06-28 DIAGNOSIS — M54.2 CERVICALGIA: Primary | ICD-10-CM

## 2018-06-28 PROCEDURE — 97112 NEUROMUSCULAR REEDUCATION: CPT | Performed by: PHYSICAL THERAPIST

## 2018-06-28 PROCEDURE — 97110 THERAPEUTIC EXERCISES: CPT | Performed by: PHYSICAL THERAPIST

## 2018-06-28 PROCEDURE — 97140 MANUAL THERAPY 1/> REGIONS: CPT | Performed by: PHYSICAL THERAPIST

## 2018-06-28 NOTE — PROGRESS NOTES
Daily Note     Today's date: 2018  Patient name: Shadi Bhatia  : 1990  MRN: [id number]  Referring provider: Leydi Alvarado PA-C  Dx:   Encounter Diagnosis     ICD-10-CM    1  Cervicalgia M54 2                   Subjective:  Pt c/o R sided cervical muscle soreness which he believes is due to the way he slept  Objective: See treatment diary below  Precautions none    Specialty Daily Treatment Diary     Manual         Manual cervical distraction 5'       STM B UT, sub-occipitals 10'       Upper thoracic PA mobs grade 3-4 np                           Exercise Diary         UBE bwd 5'                       B seated shoulder ER GTB x20       B seated shoulder horizontal abd GTB x20       B levator stretch 30"x3       L UT stretch 30"x3       TS extension over foam roll np               CC Rows 50# x30       Lat Pull Downs 50# x30       Prone Y, T, I 5" x15 ea       Doorway Pec Stretch  30"x3                                                                   Modalities                                          Assessment: Tolerated treatment well  Patient exhibited good technique with therapeutic exercises and would benefit from continued PT  Required cueing for prone series  Decreased R sided cervical pain post treatment  Plan: Continue per plan of care

## 2018-07-05 ENCOUNTER — OFFICE VISIT (OUTPATIENT)
Dept: PHYSICAL THERAPY | Facility: MEDICAL CENTER | Age: 28
End: 2018-07-05
Payer: OTHER MISCELLANEOUS

## 2018-07-05 DIAGNOSIS — M54.2 CERVICALGIA: Primary | ICD-10-CM

## 2018-07-05 PROCEDURE — 97140 MANUAL THERAPY 1/> REGIONS: CPT | Performed by: PHYSICAL THERAPIST

## 2018-07-05 PROCEDURE — 97110 THERAPEUTIC EXERCISES: CPT | Performed by: PHYSICAL THERAPIST

## 2018-07-05 PROCEDURE — 97112 NEUROMUSCULAR REEDUCATION: CPT | Performed by: PHYSICAL THERAPIST

## 2018-07-05 NOTE — PROGRESS NOTES
Daily Note     Today's date: 2018  Patient name: Shadi Bhatia  : 1990  MRN: [id number]  Referring provider: Leydi Alvarado PA-C  Dx:   Encounter Diagnosis     ICD-10-CM    1  Cervicalgia M54 2                   Subjective:  Pt denies pain today      Objective: See treatment diary below  Precautions none    Specialty Daily Treatment Diary     Manual        Manual cervical distraction 5' 5'      STM B UT, sub-occipitals 10' 5'      Upper thoracic PA mobs grade 3-4 np np                          Exercise Diary        UBE bwd 5' 5'                      B seated shoulder ER GTB x20 GTB x20      B seated shoulder horizontal abd GTB x20 GTB x20      B levator stretch 30"x3 30"x3      L UT stretch 30"x3 30"x3      TS extension over foam roll np np              CC Rows 50# x30 50# x30      Lat Pull Downs 50# x30 50# x20      Prone Y, T, I 5" x15 ea 5" x15 ea      Doorway Pec Stretch  30"x3 30"x3                                                                  Modalities                                          Assessment: Tolerated treatment well  Patient exhibited good technique with therapeutic exercises and would benefit from continued PT  No pain reported throughout treatment  Plan: Continue per plan of care

## 2018-07-10 ENCOUNTER — APPOINTMENT (OUTPATIENT)
Dept: PHYSICAL THERAPY | Facility: MEDICAL CENTER | Age: 28
End: 2018-07-10
Payer: OTHER MISCELLANEOUS

## 2018-07-12 ENCOUNTER — EVALUATION (OUTPATIENT)
Dept: PHYSICAL THERAPY | Facility: MEDICAL CENTER | Age: 28
End: 2018-07-12
Payer: OTHER MISCELLANEOUS

## 2018-07-12 DIAGNOSIS — M54.2 CERVICALGIA: Primary | ICD-10-CM

## 2018-07-12 PROCEDURE — 97112 NEUROMUSCULAR REEDUCATION: CPT | Performed by: PHYSICAL THERAPIST

## 2018-07-12 PROCEDURE — G8985 CARRY GOAL STATUS: HCPCS | Performed by: PHYSICAL THERAPIST

## 2018-07-12 PROCEDURE — 97110 THERAPEUTIC EXERCISES: CPT | Performed by: PHYSICAL THERAPIST

## 2018-07-12 PROCEDURE — G8984 CARRY CURRENT STATUS: HCPCS | Performed by: PHYSICAL THERAPIST

## 2018-07-12 PROCEDURE — 97140 MANUAL THERAPY 1/> REGIONS: CPT | Performed by: PHYSICAL THERAPIST

## 2018-07-12 NOTE — PROGRESS NOTES
PT Re-Evaluation  and PT Discharge    Today's date: 2018  Patient name: Aga Sutherland  : 1990  MRN: [id number]  Referring provider: Nydia Kruse PA-C  Dx:   Encounter Diagnosis     ICD-10-CM    1  Cervicalgia M54 2                   Assessment  Impairments: abnormal or restricted ROM, abnormal movement and pain with function    Assessment details: Aga Sutherland has attended   16  visits since initiating PT and has demonstrated overall improvement  Mobility and strength has improved with decreased reports of pain  Aga Sutherland has demonstrated an improvement in function as well  Currently, he has made steady progress towards his goals, but continue to remain limited with L cervical rotation and extension as well as prolonged sitting  Has been unable to RTW full duty as  and remains on light duty  Progress appears to have reached plateau at this time  Has appt w/ pain management tomorrow  Will discontinue PT unless otherwise instructed to continue by MD   Understanding of Dx/Px/POC: good   Prognosis: good    Goals  Short Term Goals:   1  Pain decreased 2 ratings in 4 weeks - MET  2   ROM increased 10* in 4 weeks - PARTIALLY MET    Long Term Goals:   1  ADLS/IADLS in related activities improved to maximal level in 8 weeks - PARTIALLY MET  2  Work performance improved to maximal level in 8 weeks - NOT MET  3  Posture is improved to maximal level in 8 weeks  - PARTIALLY MET  4  [place] with HEP in 8 weeks   - PARTIALLY MET    Plan  Patient would benefit from: skilled PT  Planned modality interventions: cryotherapy  Planned therapy interventions: stretching, strengthening, postural training, patient education, neuromuscular re-education, manual therapy, home exercise program, functional ROM exercises, flexibility and therapeutic exercise  Frequency: 2x week  Duration in weeks: 1  Treatment plan discussed with: patient        Subjective Evaluation    History of Present Illness  Mechanism of injury: Pt reports overall improvement since onset of PT; past few weeks symptoms may have reached plateau  Describes symptoms as achiness that most often occur w/ cervical extension and L rotation  Symptoms at times increased w/ prolonged sitting at work  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  Stiffness and sorenss present when getting out of bed and subsides in about 30 minutes  Appointment scheduled tomorrow w/ pain management as referred by neurosurgeon  Pain  At best pain ratin  At worst pain ratin    Patient Goals  Patient goals for therapy: decreased pain, increased motion, increased strength, return to work and independence with ADLs/IADLs          Objective     Static Posture     Comments  Seated slouched posture persists; able to correct w/ cueing      Palpation     Additional Palpation Details  Tightness persists in L sub-occipital    Active Range of Motion   Cervical/Thoracic Spine   Cervical    Flexion: 56 degrees   Extension: 45 degrees   Left lateral flexion: 22 degrees   Right lateral flexion: 35 degrees   Left rotation: 70 degrees   Right rotation: 72 degrees   Left Shoulder   Normal active range of motion    Right Shoulder   Normal active range of motion    Strength/Myotome Testing     Left Shoulder   Normal muscle strength    Right Shoulder   Normal muscle strength          Precautions: none    Daily Treatment Diary     Manual              STM L cervical musculature, sub-occipitals  Manual cervical distraction 10'            Grade 2-3 upper thoracic PA mobs np                                                       Exercise Diary              UBE bwd 5'            L levator stretch 30"x3            L UT stretch 30"x3            CC rows 50# x30            CC lat pull downs 50# x30            B shoulder ER GTB x20            B shoulder horizontal abduction GTB x20            Prone I, T Y 5" x15 ea            Doorway pec stretch 30"x3 Modalities

## 2018-07-13 ENCOUNTER — OFFICE VISIT (OUTPATIENT)
Dept: PAIN MEDICINE | Facility: CLINIC | Age: 28
End: 2018-07-13
Payer: OTHER MISCELLANEOUS

## 2018-07-13 VITALS
HEART RATE: 86 BPM | DIASTOLIC BLOOD PRESSURE: 96 MMHG | RESPIRATION RATE: 16 BRPM | HEIGHT: 70 IN | SYSTOLIC BLOOD PRESSURE: 138 MMHG | BODY MASS INDEX: 26.77 KG/M2 | WEIGHT: 187 LBS

## 2018-07-13 DIAGNOSIS — M54.12 CERVICAL RADICULOPATHY: Primary | ICD-10-CM

## 2018-07-13 PROCEDURE — 99204 OFFICE O/P NEW MOD 45 MIN: CPT | Performed by: ANESTHESIOLOGY
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
## 2018-07-31 ENCOUNTER — HOSPITAL ENCOUNTER (OUTPATIENT)
Dept: RADIOLOGY | Facility: CLINIC | Age: 28
Discharge: HOME | End: 2018-07-31
Attending: ANESTHESIOLOGY
Payer: OTHER MISCELLANEOUS

## 2018-07-31 VITALS
RESPIRATION RATE: 16 BRPM | TEMPERATURE: 98.3 F | HEART RATE: 85 BPM | OXYGEN SATURATION: 97 % | DIASTOLIC BLOOD PRESSURE: 95 MMHG | SYSTOLIC BLOOD PRESSURE: 153 MMHG

## 2018-07-31 DIAGNOSIS — M54.12 CERVICAL RADICULOPATHY: ICD-10-CM

## 2018-07-31 PROCEDURE — 62321 NJX INTERLAMINAR CRV/THRC: CPT | Performed by: ANESTHESIOLOGY

## 2018-07-31 RX ORDER — LIDOCAINE HYDROCHLORIDE 10 MG/ML
5 INJECTION, SOLUTION EPIDURAL; INFILTRATION; INTRACAUDAL; PERINEURAL ONCE
Status: COMPLETED | OUTPATIENT
Start: 2018-07-31 | End: 2018-07-31

## 2018-07-31 RX ORDER — PAPAVERINE HCL 150 MG
10 CAPSULE, EXTENDED RELEASE ORAL ONCE
Status: COMPLETED | OUTPATIENT
Start: 2018-07-31 | End: 2018-07-31

## 2018-07-31 RX ADMIN — Medication 10 MG: at 14:19

## 2018-07-31 RX ADMIN — IOHEXOL 1 ML: 300 INJECTION, SOLUTION INTRAVENOUS at 14:19

## 2018-07-31 RX ADMIN — LIDOCAINE HYDROCHLORIDE 5 ML: 10 INJECTION, SOLUTION EPIDURAL; INFILTRATION; INTRACAUDAL; PERINEURAL at 14:18
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
## 2018-07-31 NOTE — DISCHARGE INSTR - LAB
Epidural Steroid Injection   WHAT YOU NEED TO KNOW:   An epidural steroid injection (LUIS) is a procedure to inject steroid medicine into the epidural space  The epidural space is between your spinal cord and vertebrae  Steroids reduce inflammation and fluid buildup in your spine that may be causing pain  You may be given pain medicine along with the steroids  ACTIVITY  · Do not drive or operate machinery today  · No strenuous activity today - bending, lifting, etc   · You may resume normal activites starting tomorrow - start slowly and as tolerated  · You may shower today, but no tub baths or hot tubs  · You may have numbness for several hours from the local anesthetic  Please use caution and common sense, especially with weight-bearing activities  CARE OF THE INJECTION SITE  · If you have soreness or pain, apply ice to the area today (20 minutes on/20 minutes off)  · Starting tomorrow, you may use warm, moist heat or ice if needed  · You may have an increase or change in your discomfort for 36-48 hours after your treatment  · Apply ice and continue with any pain medication you have been prescribed  · [facility] if you have any of the following: redness, drainage, swelling, headache, stiff neck or fever above 100°F     SPECIAL INSTRUCTIONS  · Our office will contact you in approximately 7 days for a progress report  MEDICATIONS  · Continue to take all routine medications  · Our office may have instructed you to hold some medications  If you have a problem specifically related to your procedure, please call our office at [phone]  Problems not related to your procedure should be directed to your primary care physician

## 2018-07-31 NOTE — INTERVAL H&P NOTE
Update: (This section must be completed if the H&P was completed greater than 24 hrs to procedure or admission)    H&P reviewed  After examining the patient, I find no changed to the H&P since it had been written  Patient re-evaluated   Accept as history and physical     Gloria Martinez MD/July 31, 2018/2:24 PM

## 2018-08-07 ENCOUNTER — TELEPHONE (OUTPATIENT)
Dept: RADIOLOGY | Facility: CLINIC | Age: 28
End: 2018-08-07

## 2018-08-07 NOTE — TELEPHONE ENCOUNTER
S/W pt, states having continued pain on right side of neck but rates it as 20% improved at 5/10  Advised to use ice/heat 20 min on 20 min off  Advised it could take up to 2 weeks for maximum benefit  OV F/U made for 9/10

## 2018-09-10 ENCOUNTER — OFFICE VISIT (OUTPATIENT)
Dept: PAIN MEDICINE | Facility: CLINIC | Age: 28
End: 2018-09-10
Payer: OTHER MISCELLANEOUS

## 2018-09-10 VITALS
DIASTOLIC BLOOD PRESSURE: 90 MMHG | BODY MASS INDEX: 26.34 KG/M2 | HEART RATE: 90 BPM | WEIGHT: 184 LBS | HEIGHT: 70 IN | SYSTOLIC BLOOD PRESSURE: 150 MMHG | RESPIRATION RATE: 20 BRPM

## 2018-09-10 DIAGNOSIS — M54.12 CERVICAL RADICULITIS: Primary | ICD-10-CM

## 2018-09-10 PROCEDURE — 99214 OFFICE O/P EST MOD 30 MIN: CPT | Performed by: ANESTHESIOLOGY

## 2018-09-10 RX ORDER — DEXTROAMPHETAMINE SACCHARATE, AMPHETAMINE ASPARTATE, DEXTROAMPHETAMINE SULFATE AND AMPHETAMINE SULFATE 2.5; 2.5; 2.5; 2.5 MG/1; MG/1; MG/1; MG/1
TABLET ORAL
Refills: 0 | COMMUNITY
Start: 2018-08-24 | End: 2018-09-10

## 2018-09-10 NOTE — PROGRESS NOTES
Pt c/o neck pain that causes numbness in the left arm    Assessment:  1  Cervical radiculitis  [place] spine and pain procedure       Plan: This is a 27-year-old male who presents today for follow-up office visit status post cervical epidural steroid injection  Patient had greater than 40% pain relief for 4 weeks  Neck pain and left upper extremity pain has gradually returned  At this time, I will schedule him for repeat cervical epidural steroid injection  Complete risks and benefits including bleeding, infection, tissue reaction, nerve injury and allergic reaction were discussed  The approach was demonstrated using models and literature was provided  Verbal and written consent was obtained  My impressions and treatment recommendations were discussed in detail with the patient who verbalized understanding and had no further questions  Discharge instructions were provided  I personally saw and examined the patient and I agree with the above discussed plan of care  History of Present Illness:  Luda Ventura is a 24 y o  male who presents for a follow up office visit in regards to Neck Pain (numbness in left arm)  The patients current symptoms include Neck pain with left upper extremity radicular symptoms  The patient had a cervical epidural steroid injection approximately 6 weeks ago  Patient reports a month relief   Greater than 40%  Neck pain and left upper arm pain has gradually returned  Pain score is rated 7/10 today  I have personally reviewed and/or updated the patient's past medical history, past surgical history, family history, social history, current medications, allergies, and vital signs today  Review of Systems   Respiratory: Negative for shortness of breath  Cardiovascular: Negative for chest pain  Gastrointestinal: Negative for constipation, diarrhea, nausea and vomiting  Musculoskeletal: Negative for arthralgias, gait problem, joint swelling and myalgias          Decreased ROM  Joint stinffess   Skin: Negative for rash  Neurological: Negative for dizziness, seizures and weakness  All other systems reviewed and are negative  Patient Active Problem List   Diagnosis    Abnormal AST and ALT    Acne    Acne keloid    ADHD, predominantly inattentive type    Anxiety    Chronic rhinitis    Diffuse cervicobrachial syndrome    Pityriasis rosea    Skin rash    Acute non-recurrent frontal sinusitis    Acute recurrent sinusitis    Herniation of cervical intervertebral disc with radiculopathy    Cervical radiculopathy       Past Medical History:   Diagnosis Date    No known problems        Past Surgical History:   Procedure Laterality Date    SHOULDER SURGERY  03/27/2015       Family History   Problem Relation Age of Onset    Arthritis Mother     Diabetes Father     Hypertension Father     Hyperlipidemia Father        Social History     Occupational History    Not on file  Social History Main Topics    Smoking status: Never Smoker    Smokeless tobacco: Former User     Types: Chew      Comment: Quit Jan 2016    Alcohol use Yes      Comment: social    Drug use: No    Sexual activity: Not on file       Current Outpatient Prescriptions on File Prior to Visit   Medication Sig    acetaminophen (TYLENOL) 500 mg tablet Take 1,000 mg by mouth as needed for mild pain    ADDERALL XR, 25MG, 25 MG 24 hr capsule 25 mg every morning     No current facility-administered medications on file prior to visit  No Known Allergies    Physical Exam:    /90   Pulse 90   Resp 20   Ht 5' 10" (1 778 m)   Wt 83 5 kg (184 lb)   BMI 26 40 kg/m²     Constitutional:normal, well developed, well nourished, alert, in no distress and non-toxic and no overt pain behavior    Eyes:anicteric  HEENT:grossly intact  Neck:supple, symmetric, trachea midline and no masses   Pulmonary:even and unlabored  Cardiovascular:No edema or pitting edema present  Skin:Normal without rashes or lesions and well hydrated  Psychiatric:Mood and affect appropriate  Neurologic:Cranial Nerves II-XII grossly intact  Musculoskeletal:normal    Imaging

## 2018-10-23 ENCOUNTER — HOSPITAL ENCOUNTER (OUTPATIENT)
Dept: RADIOLOGY | Facility: CLINIC | Age: 28
Discharge: HOME | End: 2018-10-23
Attending: ANESTHESIOLOGY
Payer: OTHER MISCELLANEOUS

## 2018-10-23 VITALS
HEART RATE: 97 BPM | SYSTOLIC BLOOD PRESSURE: 140 MMHG | DIASTOLIC BLOOD PRESSURE: 83 MMHG | OXYGEN SATURATION: 96 % | TEMPERATURE: 98.6 F | RESPIRATION RATE: 18 BRPM

## 2018-10-23 DIAGNOSIS — M54.12 CERVICAL RADICULITIS: ICD-10-CM

## 2018-10-23 PROCEDURE — 62321 NJX INTERLAMINAR CRV/THRC: CPT | Performed by: ANESTHESIOLOGY

## 2018-10-23 RX ORDER — PAPAVERINE HCL 150 MG
10 CAPSULE, EXTENDED RELEASE ORAL ONCE
Status: COMPLETED | OUTPATIENT
Start: 2018-10-23 | End: 2018-10-23

## 2018-10-23 RX ORDER — LIDOCAINE HYDROCHLORIDE 10 MG/ML
5 INJECTION, SOLUTION EPIDURAL; INFILTRATION; INTRACAUDAL; PERINEURAL ONCE
Status: COMPLETED | OUTPATIENT
Start: 2018-10-23 | End: 2018-10-23

## 2018-10-23 RX ADMIN — LIDOCAINE HYDROCHLORIDE 5 ML: 10 INJECTION, SOLUTION EPIDURAL; INFILTRATION; INTRACAUDAL; PERINEURAL at 14:47

## 2018-10-23 RX ADMIN — IOHEXOL 1 ML: 300 INJECTION, SOLUTION INTRAVENOUS at 14:48

## 2018-10-23 RX ADMIN — Medication 10 MG: at 14:47

## 2018-10-23 NOTE — DISCHARGE INSTR - LAB
Epidural Steroid Injection   WHAT YOU NEED TO KNOW:   An epidural steroid injection (LUIS) is a procedure to inject steroid medicine into the epidural space  The epidural space is between your spinal cord and vertebrae  Steroids reduce inflammation and fluid buildup in your spine that may be causing pain  You may be given pain medicine along with the steroids  ACTIVITY  · Do not drive or operate machinery today  · No strenuous activity today - bending, lifting, etc   · You may resume normal activites starting tomorrow - start slowly and as tolerated  · You may shower today, but no tub baths or hot tubs  · You may have numbness for several hours from the local anesthetic  Please use caution and common sense, especially with weight-bearing activities  CARE OF THE INJECTION SITE  · If you have soreness or pain, apply ice to the area today (20 minutes on/20 minutes off)  · Starting tomorrow, you may use warm, moist heat or ice if needed  · You may have an increase or change in your discomfort for 36-48 hours after your treatment  · Apply ice and continue with any pain medication you have been prescribed  · [facility] if you have any of the following: redness, drainage, swelling, headache, stiff neck or fever above 100°F     SPECIAL INSTRUCTIONS  · Our office will contact you in approximately 7 days for a progress report  MEDICATIONS  · Continue to take all routine medications  · Our office may have instructed you to hold some medications  If you have a problem specifically related to your procedure, please call our office at [phone]  Problems not related to your procedure should be directed to your primary care physician

## 2018-10-23 NOTE — H&P
History of Present Illness: The patient is a 34 y o  male who presents with complaints of neck pain  Patient Active Problem List   Diagnosis    Abnormal AST and ALT    Acne    Acne keloid    ADHD, predominantly inattentive type    Anxiety    Chronic rhinitis    Diffuse cervicobrachial syndrome    Pityriasis rosea    Skin rash    Acute non-recurrent frontal sinusitis    Acute recurrent sinusitis    Herniation of cervical intervertebral disc with radiculopathy    Cervical radiculopathy       Past Medical History:   Diagnosis Date    No known problems        Past Surgical History:   Procedure Laterality Date    SHOULDER SURGERY  03/27/2015         Current Outpatient Prescriptions:     acetaminophen (TYLENOL) 500 mg tablet, Take 1,000 mg by mouth as needed for mild pain, Disp: , Rfl:     ADDERALL XR, 25MG, 25 MG 24 hr capsule, 25 mg every morning, Disp: , Rfl: 0    No Known Allergies    Physical Exam: There were no vitals filed for this visit  General: Awake, Alert, Oriented x 3, Mood and affect appropriate  Respiratory: Respirations even and unlabored  Cardiovascular: Peripheral pulses intact; no edema  Musculoskeletal Exam: wnl    ASA Score: 2    Patient/Chart Verification  Patient ID Verified: Verbal  ID Band Applied: No  Consents Confirmed: Procedural, To be obtained in the Pre-Procedure area  H&P( within 30 days) Verified: To be obtained in the Pre-Procedure area  Interval H&P(within 24 hr) Complete (required for Outpatients and Surgery Admit only): To be obtained in the Pre-Procedure area  Allergies Reviewed: Yes  Anticoag/NSAID held?: NA  Currently on antibiotics?: No    Assessment:   1   Cervical radiculitis        Plan: daria

## 2018-10-29 ENCOUNTER — OFFICE VISIT (OUTPATIENT)
Dept: INTERNAL MEDICINE CLINIC | Facility: CLINIC | Age: 28
End: 2018-10-29
Payer: COMMERCIAL

## 2018-10-29 VITALS
TEMPERATURE: 98.4 F | BODY MASS INDEX: 27.49 KG/M2 | HEART RATE: 91 BPM | RESPIRATION RATE: 18 BRPM | WEIGHT: 192 LBS | DIASTOLIC BLOOD PRESSURE: 78 MMHG | SYSTOLIC BLOOD PRESSURE: 124 MMHG | HEIGHT: 70 IN | OXYGEN SATURATION: 100 %

## 2018-10-29 DIAGNOSIS — Z00.00 HEALTHCARE MAINTENANCE: Primary | ICD-10-CM

## 2018-10-29 PROCEDURE — 99395 PREV VISIT EST AGE 18-39: CPT | Performed by: NURSE PRACTITIONER

## 2018-10-29 NOTE — PROGRESS NOTES
Assessment/Plan:    Physical completed  Left ear is clear, no sign of infection  Please call if you have any concerns  Good luck! Follow up as scheduled  Diagnoses and all orders for this visit:    Healthcare maintenance    The patient was counseled regarding instructions for management, risk factor reductions, patient and family education,impressions, risks and benefits of treatment options, side effects of medications, importance of compliance with treatment  The treatment plan was reviewed with the patient/guardian and patient/guardian understands and agrees with the treatment plan  Current Outpatient Prescriptions:     acetaminophen (TYLENOL) 500 mg tablet, Take 1,000 mg by mouth as needed for mild pain, Disp: , Rfl:     ADDERALL XR, 25MG, 25 MG 24 hr capsule, 25 mg every morning, Disp: , Rfl: 0    Subjective:      Patient ID: Zoila Hernadez is a 34 y o  male  Jeemily Madisonl will be going for the [employer]Exactly test next month  He feels like he has fluid in his left ear for the past day or so  The following portions of the patient's history were reviewed and updated as appropriate:   He has a past medical history of No known problems  ,   does not have any pertinent problems on file  ,   has a past surgical history that includes Shoulder surgery (03/27/2015)  ,  family history includes Arthritis in his mother; Diabetes in his father; Hyperlipidemia in his father; Hypertension in his father  ,   reports that he has never smoked  He has quit using smokeless tobacco  His smokeless tobacco use included Chew  He reports that he drinks alcohol  He reports that he does not use drugs  ,  has No Known Allergies       Review of Systems   Constitutional: Negative for chills and fever  HENT: Negative for ear pain, rhinorrhea, sinus pain and sore throat  Sensation of fluid in the left ear  Eyes: Negative for redness and visual disturbance     Respiratory: Negative for cough, chest tightness and shortness of breath  Cardiovascular: Negative for chest pain and palpitations  Gastrointestinal: Negative for abdominal pain, constipation, diarrhea, nausea and vomiting  Endocrine: Negative  Genitourinary: Negative for dysuria, frequency and urgency  Musculoskeletal: Negative for arthralgias, back pain and myalgias  Skin: Negative for rash  Neurological: Negative for dizziness, numbness and headaches  Psychiatric/Behavioral: Negative  Objective:  /78 (BP Location: Left arm, Patient Position: Sitting, Cuff Size: Standard)   Pulse 91   Temp 98 4 °F (36 9 °C) (Tympanic)   Resp 18   Ht 5' 10" (1 778 m)   Wt 87 1 kg (192 lb)   SpO2 100%   BMI 27 55 kg/m²     Lab Review  No visits with results within 2 Month(s) from this visit     Latest known visit with results is:   Generic Conversion - Encounter on 07/19/2017   Component Date Value    CMV IGG 07/19/2017 <0 60     CMV IGM 07/19/2017 <30 0     GLIADIN IGA ANTIBODIES 07/19/2017 4     GLIADIN IGG ANTIBODIES 07/19/2017 3     TTG IGA 07/19/2017 <2     TISSUE TRANSGLUTAMINASE * 07/19/2017 <2     ENDOMYSIAL IGA ANTIBODY 07/19/2017 Negative     IGA 07/19/2017 155     AMADO-CREWS VCA IGM 07/19/2017 <36 0     EBV EARLY ANTIGEN AB, IGG 07/19/2017 <9 0     AMADO-CREWS VCA IGG 07/19/2017 364 0*    AMADO-BARR NUCLEAR ANT* 07/19/2017 75 8*    EBV INTERPRETATION 07/19/2017 Comment     HEPATITIS B SURFACE ANTI* 07/19/2017 Negative     LIVER-KIDNEY MICROSOMAL * 07/19/2017 1 0     SMOOTH MUSCLE ANTIBODY 07/19/2017 16     Mitochondrial(M2)Ab 07/19/2017 2 6     HEPATITIS B CORE IGM ANT* 07/19/2017 Negative     HEPATITIS B CORE TOTAL A* 07/19/2017 Negative     HEPATITIS B SURFACE ANTI* 07/19/2017 <3 1*    HEPATITIS C ANTIBODY 07/19/2017 <0 1     Total Protein 07/19/2017 7 8     Albumin 07/19/2017 5 0     Total Bilirubin 07/19/2017 1 3*    BILIRUBIN DIRECT 07/19/2017 0 23     Alkaline Phosphatase 07/19/2017 75     AST 07/19/2017 18  ALT 07/19/2017 13     ALPHA-1 ANTITRYPSIN 07/19/2017 106     GGT 07/19/2017 24     CERULOPLASMIN 07/19/2017 18 2     Ferritin 07/19/2017 118     Iron 07/19/2017 120     TIBC 07/19/2017 334     UIBC (HISTORICAL) 07/19/2017 214     Iron Saturation 07/19/2017 36         Imaging: [facility]    Result Date: 10/23/2018  Narrative: Pre-procedure Diagnosis: Cervical Radiculopathy Post-procedure Diagnosis: Cervical Radiculopathy Procedure Title(s):  1  C6-C7 Translaminar epidural steroid injection     2  Intraoperative fluoroscopy Attending Surgeon:   Becky William MD Anesthesia:   Local Procedure Note: The patients history and physical exam were reviewed  I explained the details of the procedure to the patient including the risks, benefits and other alternatives  We had an informed discussion and the patient verbalized understanding and signed the consent form  All questions were answered appropriately  The patient was taken to the Fluoroscopy Suite and placed prone on the table with support and arms down at the sides  Prior to the procedure, a "time out" was performed  Fluoroscopic guidance was used to locate the anatomic landmarks for an approach to the C6-C7 interspace  The area was sterilely prepped three times using ChloroPrep x3 and sterilely draped  The skin was anesthetized with 3ccs of 1% lidocaine in the midline at this interspace  A 3 5 inch, 20-gauge Tuohy epidural needle was directed toward the interspace under fluoroscopic guidance until the ligamentum flavum was engaged  From this point, a 45 degree contra-lateral oblique view obtained  The epidural tuohy needle was gradually advanced using loss-of-resistance (TAMARA) technique  Once TAMARA was obtained, 1cc Omnipaque 300 contrast solution was injected  An appropriate epidurogram was noted   Then, after negative aspiration, a solution consisting of 1-cc of 1% lidocaine and 10mg dexamethasone and 2-cc preservative-free saline was easily injected, demonstrating good washout of the dye  The needle was then removed with a 1cc of 1% lidocaine flush  The patients neck was cleaned and a bandage was placed over the site of needle insertion  Disposition: The patient tolerated the procedure well, and there were no apparent complications  The patient was taken to the recovery area where written discharge instructions for the procedure were given  Physical Exam   Constitutional: He is oriented to person, place, and time  He appears well-developed and well-nourished  HENT:   Head: Normocephalic and atraumatic  Right Ear: External ear normal    Left Ear: External ear normal    Nose: Nose normal    Mouth/Throat: Oropharynx is clear and moist    Eyes: Pupils are equal, round, and reactive to light  Conjunctivae and lids are normal    Neck: Normal range of motion  Neck supple  Carotid bruit is not present  No thyroid mass present  Cardiovascular: Normal rate, regular rhythm, normal heart sounds and intact distal pulses  Pulmonary/Chest: Effort normal and breath sounds normal    Abdominal: Soft  Bowel sounds are normal    Musculoskeletal: Normal range of motion  Neurological: He is alert and oriented to person, place, and time  He has normal strength and normal reflexes  Skin: Skin is warm, dry and intact  Psychiatric: He has a normal mood and affect   His speech is normal and behavior is normal  Cognition and memory are normal

## 2018-10-29 NOTE — PATIENT INSTRUCTIONS
Physical completed  Left ear is clear, no sign of infection  Please call if you have any concerns  Good luck! Follow up as scheduled

## 2018-10-30 ENCOUNTER — TELEPHONE (OUTPATIENT)
Dept: RADIOLOGY | Facility: CLINIC | Age: 28
End: 2018-10-30

## 2018-11-05 NOTE — TELEPHONE ENCOUNTER
S/w pt  States no noticeable relief from procedure  Rates pain 6/10 today  Scheduled f/u 12/19  Any recommendations prior to ov?

## 2018-12-18 ENCOUNTER — TELEPHONE (OUTPATIENT)
Dept: PAIN MEDICINE | Facility: CLINIC | Age: 28
End: 2018-12-18

## 2018-12-18 NOTE — TELEPHONE ENCOUNTER
Called [employer] to see if claim was still o/a  They said it was, but in January the pt will have a new [address]  [employer]  : [place]  Phone #: [phone] ext [phone]    I lm on  for new [employer] company to verify and also to get the new claim #

## 2018-12-18 NOTE — TELEPHONE ENCOUNTER
S/w new   She said the claim will be o/a after Jan 1st 2019  I will have to call her back the week of Jan 4th to get the new claim #

## 2018-12-19 ENCOUNTER — OFFICE VISIT (OUTPATIENT)
Dept: PAIN MEDICINE | Facility: CLINIC | Age: 28
End: 2018-12-19
Payer: OTHER MISCELLANEOUS

## 2018-12-19 VITALS
SYSTOLIC BLOOD PRESSURE: 134 MMHG | HEIGHT: 70 IN | HEART RATE: 97 BPM | DIASTOLIC BLOOD PRESSURE: 86 MMHG | WEIGHT: 192 LBS | BODY MASS INDEX: 27.49 KG/M2 | RESPIRATION RATE: 18 BRPM

## 2018-12-19 DIAGNOSIS — M54.12 CERVICAL RADICULOPATHY: Primary | ICD-10-CM

## 2018-12-19 PROCEDURE — 99214 OFFICE O/P EST MOD 30 MIN: CPT | Performed by: ANESTHESIOLOGY

## 2018-12-19 NOTE — PROGRESS NOTES
Assessment:  1  Cervical radiculopathy  [facility] spine and pain procedure     Plan: This is a 68-year-old male with history of herniated cervical disc C6-C7  Patient had a cervical epidural steroid injection x2  Patient is here today to schedule 3rd injection  Proceed forward and schedule repeat cervical epidural steroid injection  Encouraged to continue with home exercise regimen  Patient will be provided with sheet of exercises  Complete risks and benefits including bleeding, infection, tissue reaction, nerve injury and allergic reaction were discussed  The approach was demonstrated using models and literature was provided  Verbal and written consent was obtained  My impressions and treatment recommendations were discussed in detail with the patient who verbalized understanding and had no further questions  Discharge instructions were provided  I personally saw and examined the patient and I agree with the above discussed plan of care  History of Present Illness:  Michael Harrison is a 34 y o  male who presents for a follow up office visit in regards to Neck Pain  The patients current symptoms include occasional neck pain radiating down the left upper extremity  Patient describes pain as dull/achy in nature, pressure-like and shooting  We have performed a cervical epidural steroid injection on October 23, 2018  Patient reports 20% pain relief for approximately 10 days  Pain is rated 6 5/10  No recent changes in health  I have personally reviewed and/or updated the patient's past medical history, past surgical history, family history, social history, current medications, allergies, and vital signs today  Review of Systems   Respiratory: Negative for shortness of breath  Cardiovascular: Negative for chest pain  Gastrointestinal: Negative for constipation, diarrhea, nausea and vomiting  Musculoskeletal: Negative for arthralgias, gait problem, joint swelling and myalgias  Decreased ROM, Joint stiffness   Skin: Negative for rash  Neurological: Negative for dizziness, seizures and weakness  All other systems reviewed and are negative  Patient Active Problem List   Diagnosis    Abnormal AST and ALT    Acne    Acne keloid    ADHD, predominantly inattentive type    Anxiety    Chronic rhinitis    Diffuse cervicobrachial syndrome    Pityriasis rosea    Skin rash    Acute non-recurrent frontal sinusitis    Acute recurrent sinusitis    Herniation of cervical intervertebral disc with radiculopathy    Cervical radiculopathy    Healthcare maintenance       Past Medical History:   Diagnosis Date    No known problems        Past Surgical History:   Procedure Laterality Date    SHOULDER SURGERY  03/27/2015       Family History   Problem Relation Age of Onset    Arthritis Mother     Diabetes Father     Hypertension Father     Hyperlipidemia Father        Social History     Occupational History    Not on file  Social History Main Topics    Smoking status: Never Smoker    Smokeless tobacco: Former User     Types: Chew      Comment: Quit Jan 2016    Alcohol use Yes      Comment: social    Drug use: No    Sexual activity: Not on file       Current Outpatient Prescriptions on File Prior to Visit   Medication Sig    acetaminophen (TYLENOL) 500 mg tablet Take 1,000 mg by mouth as needed for mild pain    ADDERALL XR, 25MG, 25 MG 24 hr capsule 25 mg every morning     No current facility-administered medications on file prior to visit  No Known Allergies    Physical Exam:    /86   Pulse 97   Resp 18   Ht 5' 10" (1 778 m)   Wt 87 1 kg (192 lb)   BMI 27 55 kg/m²     Constitutional:normal, well developed, well nourished, alert, in no distress and non-toxic and no overt pain behavior    Eyes:anicteric  HEENT:grossly intact  Neck:supple, symmetric, trachea midline and no masses   Pulmonary:even and unlabored  Cardiovascular:No edema or pitting edema present  Skin:Normal without rashes or lesions and well hydrated  Psychiatric:Mood and affect appropriate  Neurologic:Cranial Nerves II-XII grossly intact  Musculoskeletal:normal    Imaging

## 2019-01-08 NOTE — TELEPHONE ENCOUNTER
Pt called back and said he would give us the new claim # after it is made up next week  For now we are using the pt's ss# in its place

## 2019-01-10 ENCOUNTER — HOSPITAL ENCOUNTER (OUTPATIENT)
Dept: RADIOLOGY | Facility: CLINIC | Age: 29
Discharge: HOME | End: 2019-01-10
Attending: ANESTHESIOLOGY | Admitting: ANESTHESIOLOGY
Payer: OTHER MISCELLANEOUS

## 2019-01-10 VITALS
DIASTOLIC BLOOD PRESSURE: 82 MMHG | OXYGEN SATURATION: 97 % | TEMPERATURE: 97.7 F | RESPIRATION RATE: 18 BRPM | HEART RATE: 81 BPM | SYSTOLIC BLOOD PRESSURE: 131 MMHG

## 2019-01-10 DIAGNOSIS — M54.12 CERVICAL RADICULOPATHY: ICD-10-CM

## 2019-01-10 PROCEDURE — 62321 NJX INTERLAMINAR CRV/THRC: CPT | Performed by: ANESTHESIOLOGY

## 2019-01-10 RX ORDER — PAPAVERINE HCL 150 MG
10 CAPSULE, EXTENDED RELEASE ORAL ONCE
Status: COMPLETED | OUTPATIENT
Start: 2019-01-10 | End: 2019-01-10

## 2019-01-10 RX ORDER — 0.9 % SODIUM CHLORIDE 0.9 %
3 VIAL (ML) INJECTION ONCE
Status: COMPLETED | OUTPATIENT
Start: 2019-01-10 | End: 2019-01-10

## 2019-01-10 RX ORDER — LIDOCAINE HYDROCHLORIDE 10 MG/ML
5 INJECTION, SOLUTION EPIDURAL; INFILTRATION; INTRACAUDAL; PERINEURAL ONCE
Status: COMPLETED | OUTPATIENT
Start: 2019-01-10 | End: 2019-01-10

## 2019-01-10 RX ADMIN — IOHEXOL 1 ML: 300 INJECTION, SOLUTION INTRAVENOUS at 14:10

## 2019-01-10 RX ADMIN — Medication 10 MG: at 14:10

## 2019-01-10 RX ADMIN — SODIUM CHLORIDE 3 ML: 9 INJECTION, SOLUTION INTRAMUSCULAR; INTRAVENOUS; SUBCUTANEOUS at 14:09

## 2019-01-10 RX ADMIN — LIDOCAINE HYDROCHLORIDE 5 ML: 10 INJECTION, SOLUTION EPIDURAL; INFILTRATION; INTRACAUDAL; PERINEURAL at 14:09

## 2019-01-10 NOTE — DISCHARGE INSTR - LAB
Epidural Steroid Injection   WHAT YOU NEED TO KNOW:   An epidural steroid injection (LUIS) is a procedure to inject steroid medicine into the epidural space  The epidural space is between your spinal cord and vertebrae  Steroids reduce inflammation and fluid buildup in your spine that may be causing pain  You may be given pain medicine along with the steroids  ACTIVITY  · Do not drive or operate machinery today  · No strenuous activity today - bending, lifting, etc   · You may resume normal activites starting tomorrow - start slowly and as tolerated  · You may shower today, but no tub baths or hot tubs  · You may have numbness for several hours from the local anesthetic  Please use caution and common sense, especially with weight-bearing activities  CARE OF THE INJECTION SITE  · If you have soreness or pain, apply ice to the area today (20 minutes on/20 minutes off)  · Starting tomorrow, you may use warm, moist heat or ice if needed  · You may have an increase or change in your discomfort for 36-48 hours after your treatment  · Apply ice and continue with any pain medication you have been prescribed  · [facility] if you have any of the following: redness, drainage, swelling, headache, stiff neck or fever above 100°F     SPECIAL INSTRUCTIONS  · Our office will contact you in approximately 7 days for a progress report  MEDICATIONS  · Continue to take all routine medications  · Our office may have instructed you to hold some medications  If you have a problem specifically related to your procedure, please call our office at [phone]  Problems not related to your procedure should be directed to your primary care physician

## 2019-01-10 NOTE — H&P (VIEW-ONLY)
Assessment:  1  Cervical radiculopathy  FL spine and pain procedure     Plan: This is a 28-year-old male with history of herniated cervical disc C6-C7  Patient had a cervical epidural steroid injection x2  Patient is here today to schedule 3rd injection  Proceed forward and schedule repeat cervical epidural steroid injection  Encouraged to continue with home exercise regimen  Patient will be provided with sheet of exercises  Complete risks and benefits including bleeding, infection, tissue reaction, nerve injury and allergic reaction were discussed  The approach was demonstrated using models and literature was provided  Verbal and written consent was obtained  My impressions and treatment recommendations were discussed in detail with the patient who verbalized understanding and had no further questions  Discharge instructions were provided  I personally saw and examined the patient and I agree with the above discussed plan of care  History of Present Illness:  Vernia Merlin is a 34 y o  male who presents for a follow up office visit in regards to Neck Pain  The patients current symptoms include occasional neck pain radiating down the left upper extremity  Patient describes pain as dull/achy in nature, pressure-like and shooting  We have performed a cervical epidural steroid injection on October 23, 2018  Patient reports 20% pain relief for approximately 10 days  Pain is rated 6 5/10  No recent changes in health  I have personally reviewed and/or updated the patient's past medical history, past surgical history, family history, social history, current medications, allergies, and vital signs today  Review of Systems   Respiratory: Negative for shortness of breath  Cardiovascular: Negative for chest pain  Gastrointestinal: Negative for constipation, diarrhea, nausea and vomiting  Musculoskeletal: Negative for arthralgias, gait problem, joint swelling and myalgias  Decreased ROM, Joint stiffness   Skin: Negative for rash  Neurological: Negative for dizziness, seizures and weakness  All other systems reviewed and are negative  Patient Active Problem List   Diagnosis    Abnormal AST and ALT    Acne    Acne keloid    ADHD, predominantly inattentive type    Anxiety    Chronic rhinitis    Diffuse cervicobrachial syndrome    Pityriasis rosea    Skin rash    Acute non-recurrent frontal sinusitis    Acute recurrent sinusitis    Herniation of cervical intervertebral disc with radiculopathy    Cervical radiculopathy    Healthcare maintenance       Past Medical History:   Diagnosis Date    No known problems        Past Surgical History:   Procedure Laterality Date    SHOULDER SURGERY  03/27/2015       Family History   Problem Relation Age of Onset    Arthritis Mother     Diabetes Father     Hypertension Father     Hyperlipidemia Father        Social History     Occupational History    Not on file  Social History Main Topics    Smoking status: Never Smoker    Smokeless tobacco: Former User     Types: Chew      Comment: Quit Jan 2016    Alcohol use Yes      Comment: social    Drug use: No    Sexual activity: Not on file       Current Outpatient Prescriptions on File Prior to Visit   Medication Sig    acetaminophen (TYLENOL) 500 mg tablet Take 1,000 mg by mouth as needed for mild pain    ADDERALL XR, 25MG, 25 MG 24 hr capsule 25 mg every morning     No current facility-administered medications on file prior to visit  No Known Allergies    Physical Exam:    /86   Pulse 97   Resp 18   Ht 5' 10" (1 778 m)   Wt 87 1 kg (192 lb)   BMI 27 55 kg/m²     Constitutional:normal, well developed, well nourished, alert, in no distress and non-toxic and no overt pain behavior    Eyes:anicteric  HEENT:grossly intact  Neck:supple, symmetric, trachea midline and no masses   Pulmonary:even and unlabored  Cardiovascular:No edema or pitting edema present  Skin:Normal without rashes or lesions and well hydrated  Psychiatric:Mood and affect appropriate  Neurologic:Cranial Nerves II-XII grossly intact  Musculoskeletal:normal    Imaging

## 2019-01-10 NOTE — INTERVAL H&P NOTE
Update: (This section must be completed if the H&P was completed greater than 24 hrs to procedure or admission)    H&P reviewed  After examining the patient, I find no changed to the H&P since it had been written  Patient re-evaluated   Accept as history and physical     Seb Samuels MD/January 10, 2019/1:58 PM

## 2019-01-17 ENCOUNTER — TELEPHONE (OUTPATIENT)
Dept: PAIN MEDICINE | Facility: CLINIC | Age: 29
End: 2019-01-17

## 2019-01-21 NOTE — TELEPHONE ENCOUNTER
Patient called stating his % of improvement is 0 and his pain level is 6      Call back# [phone]    Dr Roberto's patient

## 2019-02-25 ENCOUNTER — OFFICE VISIT (OUTPATIENT)
Dept: PAIN MEDICINE | Facility: CLINIC | Age: 29
End: 2019-02-25
Payer: OTHER MISCELLANEOUS

## 2019-02-25 VITALS
RESPIRATION RATE: 14 BRPM | HEART RATE: 88 BPM | DIASTOLIC BLOOD PRESSURE: 80 MMHG | BODY MASS INDEX: 27.49 KG/M2 | WEIGHT: 192 LBS | SYSTOLIC BLOOD PRESSURE: 114 MMHG | HEIGHT: 70 IN

## 2019-02-25 DIAGNOSIS — M54.12 CERVICAL RADICULOPATHY: Primary | ICD-10-CM

## 2019-02-25 DIAGNOSIS — M47.812 SPONDYLOSIS OF CERVICAL REGION WITHOUT MYELOPATHY OR RADICULOPATHY: ICD-10-CM

## 2019-02-25 PROCEDURE — 99214 OFFICE O/P EST MOD 30 MIN: CPT | Performed by: ANESTHESIOLOGY

## 2019-02-25 NOTE — PROGRESS NOTES
Assessment:  1  Cervical radiculopathy     2  Spondylosis of cervical region without myelopathy or radiculopathy  FL spine and pain procedure       Plan:  Patient is a 34 y o male with neck pain and spasms  Pain is dull/achy and is associated with pressure and tightness  The patient's pain persists despite time, relative rest, activity modification and therapy  Based on the patient's symptoms and examination, I suspect that a component of pain is being generated by the facet joints  The facet joints are only one of several possible axial pain generators  This was reviewed with the patient today  We will move forward with medial branch blockade at levels [left C4-C6] utilizing a double block paradigm  If the patient receives significant relief of appropriate duration with 2% lidocaine, we will confirm with   25% bupivacaine  If the patient demonstrates appropriate response to medial branch blockade we will schedule radiofrequency ablation of the lumbar medial branches to essentially denervate the facet joints  In the office today, we reviewed the nature of the patient's pathology in depth using diagrams and models  We discussed the approach we would take for the medial branch block and provided literature for home review  The patient understands the risks associated with the procedure including bleeding, infection, tissue action, allergic reaction, nerve injury and verbal and written consent was obtained today  My impressions and treatment recommendations were discussed in detail with the patient who verbalized understanding and had no further questions  Discharge instructions were provided  I personally saw and examined the patient and I agree with the above discussed plan of care  History of Present Illness:  Chas Quiles is a 34 y o  male who presents for a follow up office visit in regards to Neck Pain and Shoulder Pain     The patients current symptoms include neck pain and left arm pain which is neuropathic in nature  Patient had DINORAH X3 which he said helped temporarily for 2 weeks  Patient has completed PT which he states helped a bit  Pain is rated 6/10  Neck pain is aggravated with turning his head and leaning backwards  I have personally reviewed and/or updated the patient's past medical history, past surgical history, family history, social history, current medications, allergies, and vital signs today  Review of Systems   Respiratory: Negative for shortness of breath  Cardiovascular: Negative for chest pain  Gastrointestinal: Negative for constipation, diarrhea, nausea and vomiting  Musculoskeletal: Negative for arthralgias, gait problem, joint swelling and myalgias  Decreased ROM, Joint stiffness   Skin: Negative for rash  Neurological: Negative for dizziness, seizures and weakness  All other systems reviewed and are negative        Patient Active Problem List   Diagnosis    Abnormal AST and ALT    Acne    Acne keloid    ADHD, predominantly inattentive type    Anxiety    Chronic rhinitis    Diffuse cervicobrachial syndrome    Pityriasis rosea    Skin rash    Acute non-recurrent frontal sinusitis    Acute recurrent sinusitis    Herniation of cervical intervertebral disc with radiculopathy    Cervical radiculopathy    Healthcare maintenance       Past Medical History:   Diagnosis Date    No known problems        Past Surgical History:   Procedure Laterality Date    SHOULDER SURGERY  03/27/2015       Family History   Problem Relation Age of Onset    Arthritis Mother     Diabetes Father     Hypertension Father     Hyperlipidemia Father        Social History     Occupational History    Not on file   Tobacco Use    Smoking status: Never Smoker    Smokeless tobacco: Former User     Types: Chew    Tobacco comment: Quit Jan 2016   Substance and Sexual Activity    Alcohol use: Yes     Comment: social    Drug use: No    Sexual activity: Not on file Current Outpatient Medications on File Prior to Visit   Medication Sig    acetaminophen (TYLENOL) 500 mg tablet Take 1,000 mg by mouth as needed for mild pain    ADDERALL XR, 25MG, 25 MG 24 hr capsule 25 mg every morning     No current facility-administered medications on file prior to visit  No Known Allergies    Physical Exam:    /80   Pulse 88   Resp 14   Ht 5' 10" (1 778 m)   Wt 87 1 kg (192 lb)   BMI 27 55 kg/m²     Constitutional:normal, well developed, well nourished, alert, in no distress and non-toxic and no overt pain behavior    Eyes:anicteric  HEENT:grossly intact  Neck:supple, symmetric, trachea midline and no masses   Pulmonary:even and unlabored  Cardiovascular:No edema or pitting edema present  Skin:Normal without rashes or lesions and well hydrated  Psychiatric:Mood and affect appropriate  Neurologic:Cranial Nerves II-XII grossly intact  Musculoskeletal:normal    Imaging

## 2019-04-29 ENCOUNTER — TELEPHONE (OUTPATIENT)
Dept: PAIN MEDICINE | Facility: CLINIC | Age: 29
End: 2019-04-29

## 2019-05-13 ENCOUNTER — OFFICE VISIT (OUTPATIENT)
Dept: INTERNAL MEDICINE CLINIC | Facility: CLINIC | Age: 29
End: 2019-05-13
Payer: COMMERCIAL

## 2019-05-13 VITALS
BODY MASS INDEX: 28.8 KG/M2 | SYSTOLIC BLOOD PRESSURE: 128 MMHG | WEIGHT: 201.2 LBS | HEART RATE: 90 BPM | RESPIRATION RATE: 18 BRPM | DIASTOLIC BLOOD PRESSURE: 72 MMHG | TEMPERATURE: 97.7 F | HEIGHT: 70 IN | OXYGEN SATURATION: 97 %

## 2019-05-13 DIAGNOSIS — Z00.00 HEALTHCARE MAINTENANCE: Primary | ICD-10-CM

## 2019-05-13 PROCEDURE — 99395 PREV VISIT EST AGE 18-39: CPT | Performed by: NURSE PRACTITIONER

## 2019-07-17 ENCOUNTER — TELEPHONE (OUTPATIENT)
Dept: NEUROLOGY | Facility: CLINIC | Age: 29
End: 2019-07-17

## 2019-07-17 NOTE — TELEPHONE ENCOUNTER
Received records request in the mail from [facility] and [address] [place] [address] attorneys at law requesting all records on the patient      [address]  Fax [phone]    Faxed to [facility] on 7/17/2019

## 2019-08-13 PROBLEM — L73.1 PSEUDOFOLLICULITIS BARBAE: Status: ACTIVE | Noted: 2019-08-13

## 2019-09-16 ENCOUNTER — TELEPHONE (OUTPATIENT)
Dept: NEUROLOGY | Facility: CLINIC | Age: 29
End: 2019-09-16

## 2019-12-19 ENCOUNTER — OFFICE VISIT (OUTPATIENT)
Dept: INTERNAL MEDICINE CLINIC | Facility: CLINIC | Age: 29
End: 2019-12-19
Payer: COMMERCIAL

## 2019-12-19 VITALS
HEIGHT: 72 IN | RESPIRATION RATE: 16 BRPM | TEMPERATURE: 96.2 F | DIASTOLIC BLOOD PRESSURE: 70 MMHG | HEART RATE: 97 BPM | OXYGEN SATURATION: 95 % | BODY MASS INDEX: 26.14 KG/M2 | SYSTOLIC BLOOD PRESSURE: 120 MMHG | WEIGHT: 193 LBS

## 2019-12-19 DIAGNOSIS — F41.9 ANXIETY: ICD-10-CM

## 2019-12-19 DIAGNOSIS — F90.0 ADHD, PREDOMINANTLY INATTENTIVE TYPE: ICD-10-CM

## 2019-12-19 DIAGNOSIS — R05.9 COUGH: ICD-10-CM

## 2019-12-19 DIAGNOSIS — Z02.1 DRUG SCREENING, PRE-EMPLOYMENT: Primary | ICD-10-CM

## 2019-12-19 PROCEDURE — 1036F TOBACCO NON-USER: CPT | Performed by: NURSE PRACTITIONER

## 2019-12-19 PROCEDURE — 3008F BODY MASS INDEX DOCD: CPT | Performed by: NURSE PRACTITIONER

## 2019-12-19 PROCEDURE — 99214 OFFICE O/P EST MOD 30 MIN: CPT | Performed by: NURSE PRACTITIONER

## 2019-12-19 RX ORDER — ESCITALOPRAM OXALATE 10 MG/1
15 TABLET ORAL DAILY
Refills: 0 | COMMUNITY
Start: 2019-12-09 | End: 2021-09-20 | Stop reason: ALTCHOICE

## 2019-12-19 RX ORDER — PREDNISONE 10 MG/1
10 TABLET ORAL DAILY
Qty: 5 TABLET | Refills: 0 | Status: SHIPPED | OUTPATIENT
Start: 2019-12-19 | End: 2020-09-04 | Stop reason: ALTCHOICE

## 2019-12-19 RX ORDER — QUETIAPINE FUMARATE 25 MG/1
12.5-25 TABLET, FILM COATED ORAL
Refills: 0 | COMMUNITY
Start: 2019-09-25

## 2019-12-19 NOTE — PROGRESS NOTES
Assessment/Plan:    1  Cough- Start low dose prednisone  Daily x 5 days  You can take Ibuprofen or Tylenol for headache  2  Physical form for [employer] completed, pending five panel drug screen  We will call you when result is received  3  Anxiety, ADHD- Followed by psychiatry  Follow up as needed  BMI Counseling: Body mass index is 26 18 kg/m²  The BMI is above normal  Nutrition recommendations include encouraging healthy choices of fruits and vegetables  Exercise recommendations include exercising 3-5 times per week  No pharmacotherapy was ordered  Patient referred to PCP due to patient being overweight  Diagnoses and all orders for this visit:    Drug screening, pre-employment  -     Five Drug Screen    Anxiety    ADHD, predominantly inattentive type    Cough  -     predniSONE 10 mg tablet; Take 1 tablet (10 mg total) by mouth daily    Other orders  -     escitalopram (LEXAPRO) 10 mg tablet; Take 15 mg by mouth daily  -     QUEtiapine (SEROquel) 25 mg tablet; Take 12 5-25 mg by mouth daily at bedtime        The patient was counseled regarding instructions for management, risk factor reductions, patient and family education,impressions, risks and benefits of treatment options, side effects of medications, importance of compliance with treatment  The treatment plan was reviewed with the patient/guardian and patient/guardian understands and agrees with the treatment plan              Current Outpatient Medications:     ADDERALL XR, 25MG, 25 MG 24 hr capsule, 25 mg every morning, Disp: , Rfl: 0    escitalopram (LEXAPRO) 10 mg tablet, Take 15 mg by mouth daily, Disp: , Rfl: 0    QUEtiapine (SEROquel) 25 mg tablet, Take 12 5-25 mg by mouth daily at bedtime, Disp: , Rfl: 0    acetaminophen (TYLENOL) 500 mg tablet, Take 1,000 mg by mouth as needed for mild pain, Disp: , Rfl:     predniSONE 10 mg tablet, Take 1 tablet (10 mg total) by mouth daily, Disp: 5 tablet, Rfl: 0    Subjective: Patient ID: Sharifa Lynn is a 34 y o  male  Here today with few days of body aches, chills, nasal congestion, possible fever yesterday, headache  Followed by Dr Alla Romano, psychiatrist in [facility] for ADHD and anxiety- stated on Escitalopram and generic Seroquel a few weeks ago  Denies suicidal ideations  Applying for municipal police- needs physical form completed  The following portions of the patient's history were reviewed and updated as appropriate:   He has a past medical history of No known problems  ,  does not have any pertinent problems on file  ,   has a past surgical history that includes Shoulder surgery (03/27/2015)  ,  family history includes Arthritis in his mother; Diabetes in his father; Hyperlipidemia in his father; Hypertension in his father  ,   reports that he has never smoked  He has quit using smokeless tobacco   His smokeless tobacco use included chew  He reports that he drinks alcohol  He reports that he does not use drugs  ,  has No Known Allergies       Review of Systems   Constitutional: Positive for chills, fatigue and fever  HENT: Positive for congestion  Negative for ear pain, rhinorrhea, sinus pain and sore throat  Eyes: Negative for redness and visual disturbance  Respiratory: Positive for cough  Negative for chest tightness and shortness of breath  Cardiovascular: Negative for chest pain and palpitations  Gastrointestinal: Negative for abdominal pain, constipation, diarrhea, nausea and vomiting  Endocrine: Negative  Genitourinary: Negative for dysuria, frequency and urgency  Musculoskeletal: Negative for arthralgias, back pain and myalgias  Skin: Negative for rash  Neurological: Negative for dizziness, numbness and headaches  Psychiatric/Behavioral: Negative            Objective:  /70   Pulse 97   Temp (!) 96 2 °F (35 7 °C)   Resp 16   Ht 6' (1 829 m)   Wt 87 5 kg (193 lb)   SpO2 95%   BMI 26 18 kg/m²     Lab Review  No visits with results within 2 Month(s) from this visit  Latest known visit with results is:   Generic Conversion - Encounter on 07/19/2017   Component Date Value    CMV IGG 07/19/2017 <0 60     CMV IGM 07/19/2017 <30 0     GLIADIN IGA ANTIBODIES 07/19/2017 4     GLIADIN IGG ANTIBODIES 07/19/2017 3     TTG IGA 07/19/2017 <2     TISSUE TRANSGLUTAMINASE * 07/19/2017 <2     ENDOMYSIAL IGA ANTIBODY 07/19/2017 Negative     IGA 07/19/2017 155     AMADO-CREWS VCA IGM 07/19/2017 <36 0     EBV EARLY ANTIGEN AB, IGG 07/19/2017 <9 0     AMADO-CREWS VCA IGG 07/19/2017 364 0*    AMADO-BARR NUCLEAR ANT* 07/19/2017 75 8*    EBV INTERPRETATION 07/19/2017 Comment     HEPATITIS B SURFACE ANTI* 07/19/2017 Negative     LIVER-KIDNEY MICROSOMAL * 07/19/2017 1 0     SMOOTH MUSCLE ANTIBODY 07/19/2017 16     Mitochondrial(M2)Ab 07/19/2017 2 6     HEPATITIS B CORE IGM ANT* 07/19/2017 Negative     HEPATITIS B CORE TOTAL A* 07/19/2017 Negative     HEPATITIS B SURFACE ANTI* 07/19/2017 <3 1*    HEPATITIS C ANTIBODY 07/19/2017 <0 1     Total Protein 07/19/2017 7 8     Albumin 07/19/2017 5 0     Total Bilirubin 07/19/2017 1 3*    BILIRUBIN DIRECT 07/19/2017 0 23     Alkaline Phosphatase 07/19/2017 75     AST 07/19/2017 18     ALT 07/19/2017 13     ALPHA-1 ANTITRYPSIN 07/19/2017 106     GGT 07/19/2017 24     CERULOPLASMIN 07/19/2017 18 2     Ferritin 07/19/2017 118     Iron 07/19/2017 120     TIBC 07/19/2017 334     UIBC 07/19/2017 214     Iron Saturation 07/19/2017 36         Imaging: No results found  Physical Exam   Constitutional: He is oriented to person, place, and time  He appears well-developed and well-nourished  HENT:   Head: Normocephalic and atraumatic  Right Ear: External ear normal    Left Ear: External ear normal    Nose: Nose normal    Mouth/Throat: Oropharynx is clear and moist    Eyes: Pupils are equal, round, and reactive to light  Conjunctivae and lids are normal    Neck: Normal range of motion  Neck supple  Carotid bruit is not present  No thyroid mass present  Cardiovascular: Normal rate, regular rhythm, normal heart sounds and intact distal pulses  Pulmonary/Chest: Effort normal and breath sounds normal    Abdominal: Soft  Bowel sounds are normal    Musculoskeletal: Normal range of motion  Neurological: He is alert and oriented to person, place, and time  He has normal strength and normal reflexes  Skin: Skin is warm, dry and intact  Psychiatric: He has a normal mood and affect   His speech is normal and behavior is normal  Cognition and memory are normal

## 2019-12-19 NOTE — PATIENT INSTRUCTIONS
1  Cough- Start low dose prednisone  Daily x 5 days  You can take Ibuprofen or Tylenol for headache  2  Physical form for [employer] completed, pending five panel drug screen  We will call you when result is received  3  Anxiety, ADHD- Followed by psychiatry  Follow up as needed

## 2020-01-07 ENCOUNTER — APPOINTMENT (OUTPATIENT)
Dept: LAB | Facility: MEDICAL CENTER | Age: 30
End: 2020-01-07
Payer: COMMERCIAL

## 2020-01-07 DIAGNOSIS — Z02.1 PRE-EMPLOYMENT HEALTH SCREENING EXAMINATION: Primary | ICD-10-CM

## 2020-01-07 PROCEDURE — 80307 DRUG TEST PRSMV CHEM ANLYZR: CPT

## 2020-01-08 ENCOUNTER — TELEPHONE (OUTPATIENT)
Dept: INTERNAL MEDICINE CLINIC | Facility: CLINIC | Age: 30
End: 2020-01-08

## 2020-01-08 LAB
AMPHETAMINES UR QL SCN: NEGATIVE NG/ML
BARBITURATES UR QL SCN: NEGATIVE NG/ML
BENZODIAZ UR QL: NEGATIVE NG/ML
BZE UR QL: NEGATIVE NG/ML
CANNABINOIDS UR QL SCN: NEGATIVE NG/ML
METHADONE UR QL SCN: NEGATIVE NG/ML
OPIATES UR QL: NEGATIVE NG/ML
PCP UR QL: NEGATIVE NG/ML
PROPOXYPH UR QL SCN: NEGATIVE NG/ML

## 2020-01-08 NOTE — TELEPHONE ENCOUNTER
----- Message from Jessica, [facility]	Otis sent at 1/8/2020  2:50 PM EST -----  Please tell Renée Colunga he can  his urine tx screen result  thanks

## 2020-09-04 ENCOUNTER — OFFICE VISIT (OUTPATIENT)
Dept: INTERNAL MEDICINE CLINIC | Facility: CLINIC | Age: 30
End: 2020-09-04
Payer: COMMERCIAL

## 2020-09-04 VITALS
OXYGEN SATURATION: 96 % | TEMPERATURE: 98.6 F | DIASTOLIC BLOOD PRESSURE: 80 MMHG | RESPIRATION RATE: 18 BRPM | HEART RATE: 100 BPM | WEIGHT: 207 LBS | HEIGHT: 72 IN | SYSTOLIC BLOOD PRESSURE: 120 MMHG | BODY MASS INDEX: 28.04 KG/M2

## 2020-09-04 DIAGNOSIS — Z13.1 SCREENING FOR DIABETES MELLITUS: ICD-10-CM

## 2020-09-04 DIAGNOSIS — Z13.220 SCREENING CHOLESTEROL LEVEL: Primary | ICD-10-CM

## 2020-09-04 PROCEDURE — 1036F TOBACCO NON-USER: CPT | Performed by: NURSE PRACTITIONER

## 2020-09-04 PROCEDURE — 99395 PREV VISIT EST AGE 18-39: CPT | Performed by: NURSE PRACTITIONER

## 2020-09-04 RX ORDER — CLONAZEPAM 0.5 MG/1
TABLET ORAL
COMMUNITY
Start: 2020-08-20 | End: 2021-09-20 | Stop reason: ALTCHOICE

## 2020-09-04 NOTE — PROGRESS NOTES
BMI Counseling: Body mass index is 28 07 kg/m²  The BMI is above normal  Nutrition recommendations include decreasing portion sizes, encouraging healthy choices of fruits and vegetables, decreasing fast food intake, consuming healthier snacks, limiting drinks that contain sugar, moderation in carbohydrate intake, increasing intake of lean protein, reducing intake of saturated and trans fat and reducing intake of cholesterol  Exercise recommendations include exercising 3-5 times per week  No pharmacotherapy was ordered  Patient referred to PCP due to patient being overweight  Assessment/Plan:    Normal physical exam  Work papers completed  Diagnoses and all orders for this visit:    Screening cholesterol level    Screening for diabetes mellitus    Other orders  -     clonazePAM (KlonoPIN) 0 5 mg tablet; take 1/2 tablet by mouth twice a day and 1 at bedtime    The patient was counseled regarding instructions for management, risk factor reductions, patient and family education,impressions, risks and benefits of treatment options, side effects of medications, importance of compliance with treatment  The treatment plan was reviewed with the patient/guardian and patient/guardian understands and agrees with the treatment plan  Current Outpatient Medications:     acetaminophen (TYLENOL) 500 mg tablet, Take 1,000 mg by mouth as needed for mild pain, Disp: , Rfl:     ADDERALL XR, 25MG, 25 MG 24 hr capsule, 25 mg every morning, Disp: , Rfl: 0    escitalopram (LEXAPRO) 10 mg tablet, Take 15 mg by mouth daily, Disp: , Rfl: 0    QUEtiapine (SEROquel) 25 mg tablet, Take 12 5-25 mg by mouth daily at bedtime, Disp: , Rfl: 0    clonazePAM (KlonoPIN) 0 5 mg tablet, take 1/2 tablet by mouth twice a day and 1 at bedtime, Disp: , Rfl:     Subjective:      Patient ID: Javier Jaime is a 34 y o  male      HPI    The following portions of the patient's history were reviewed and updated as appropriate:   He has a past medical history of No known problems  ,  does not have any pertinent problems on file  ,   has a past surgical history that includes Shoulder surgery (03/27/2015)  ,  family history includes Arthritis in his mother; Diabetes in his father; Hyperlipidemia in his father; Hypertension in his father  ,   reports that he has never smoked  He has quit using smokeless tobacco   His smokeless tobacco use included chew  He reports current alcohol use  He reports that he does not use drugs  ,  has No Known Allergies       Review of Systems   Constitutional: Negative for chills and fever  HENT: Negative for ear pain, rhinorrhea, sinus pain and sore throat  Eyes: Negative for redness and visual disturbance  Respiratory: Negative for cough, chest tightness and shortness of breath  Cardiovascular: Negative for chest pain and palpitations  Gastrointestinal: Negative for abdominal pain, constipation, diarrhea, nausea and vomiting  Endocrine: Negative  Genitourinary: Negative for dysuria, frequency and urgency  Musculoskeletal: Negative for arthralgias, back pain and myalgias  Skin: Negative for rash  Neurological: Negative for dizziness, numbness and headaches  Psychiatric/Behavioral: Negative  Objective:  /80   Pulse 100   Temp 98 6 °F (37 °C)   Resp 18   Ht 6' (1 829 m)   Wt 93 9 kg (207 lb)   SpO2 96%   BMI 28 07 kg/m²     Lab Review  No visits with results within 2 Month(s) from this visit     Latest known visit with results is:   Appointment on 01/07/2020   Component Date Value    Amphetamine Screen, Ur 01/07/2020 Negative     Barbiturate Screen, Ur 01/07/2020 Negative     Cannabinoid Scrn, Ur 01/07/2020 Negative     Methadone Screen, Urine 01/07/2020 Negative     Opiate Scrn, Ur 01/07/2020 Negative     Phencyclidine (PCP), Erick Zheng* 01/07/2020 Negative     Benzodiazepines 01/07/2020 Negative     Cocaine (Metab ) Urine 01/07/2020 Negative     Propoxyphene Screen, Blaine Lau* 01/07/2020 Negative       Imaging: No results found  Physical Exam  Constitutional:       Appearance: He is well-developed  HENT:      Head: Normocephalic and atraumatic  Right Ear: External ear normal       Left Ear: External ear normal       Nose: Nose normal    Eyes:      General: Lids are normal       Conjunctiva/sclera: Conjunctivae normal       Pupils: Pupils are equal, round, and reactive to light  Neck:      Musculoskeletal: Normal range of motion and neck supple  Thyroid: No thyroid mass  Vascular: No carotid bruit  Cardiovascular:      Rate and Rhythm: Normal rate and regular rhythm  Heart sounds: Normal heart sounds  Pulmonary:      Effort: Pulmonary effort is normal       Breath sounds: Normal breath sounds  Abdominal:      General: Bowel sounds are normal       Palpations: Abdomen is soft  Musculoskeletal: Normal range of motion  Skin:     General: Skin is warm and dry  Neurological:      Mental Status: He is alert and oriented to person, place, and time  Deep Tendon Reflexes: Reflexes are normal and symmetric     Psychiatric:         Speech: Speech normal          Behavior: Behavior normal

## 2020-09-15 ENCOUNTER — TELEPHONE (OUTPATIENT)
Dept: INTERNAL MEDICINE CLINIC | Facility: CLINIC | Age: 30
End: 2020-09-15

## 2020-09-15 DIAGNOSIS — Z13.6 SCREENING, ISCHEMIC HEART DISEASE: Primary | ICD-10-CM

## 2020-09-15 NOTE — TELEPHONE ENCOUNTER
Patient requesting Blood work order He is at the lab      Total Cholesterol, HDL, LDL   Triglycerides Glucose

## 2020-09-16 ENCOUNTER — APPOINTMENT (OUTPATIENT)
Dept: LAB | Facility: HOSPITAL | Age: 30
End: 2020-09-16
Attending: INTERNAL MEDICINE
Payer: COMMERCIAL

## 2020-09-16 DIAGNOSIS — Z13.6 SCREENING, ISCHEMIC HEART DISEASE: ICD-10-CM

## 2020-09-16 LAB
ALBUMIN SERPL BCP-MCNC: 4.7 G/DL (ref 3.5–5)
ALP SERPL-CCNC: 86 U/L (ref 46–116)
ALT SERPL W P-5'-P-CCNC: 48 U/L (ref 12–78)
ANION GAP SERPL CALCULATED.3IONS-SCNC: 7 MMOL/L (ref 4–13)
AST SERPL W P-5'-P-CCNC: 41 U/L (ref 5–45)
BILIRUB SERPL-MCNC: 0.8 MG/DL (ref 0.2–1)
BUN SERPL-MCNC: 14 MG/DL (ref 5–25)
CALCIUM SERPL-MCNC: 9.5 MG/DL (ref 8.3–10.1)
CHLORIDE SERPL-SCNC: 100 MMOL/L (ref 100–108)
CHOLEST SERPL-MCNC: 239 MG/DL (ref 50–200)
CO2 SERPL-SCNC: 32 MMOL/L (ref 21–32)
CREAT SERPL-MCNC: 1.32 MG/DL (ref 0.6–1.3)
GFR SERPL CREATININE-BSD FRML MDRD: 73 ML/MIN/1.73SQ M
GLUCOSE P FAST SERPL-MCNC: 110 MG/DL (ref 65–99)
HDLC SERPL-MCNC: 47 MG/DL
LDLC SERPL CALC-MCNC: 181 MG/DL (ref 0–100)
POTASSIUM SERPL-SCNC: 3.9 MMOL/L (ref 3.5–5.3)
PROT SERPL-MCNC: 8.5 G/DL (ref 6.4–8.2)
SODIUM SERPL-SCNC: 139 MMOL/L (ref 136–145)
TRIGL SERPL-MCNC: 55 MG/DL

## 2020-09-16 PROCEDURE — 80053 COMPREHEN METABOLIC PANEL: CPT

## 2020-09-16 PROCEDURE — 80061 LIPID PANEL: CPT

## 2020-09-16 PROCEDURE — 36415 COLL VENOUS BLD VENIPUNCTURE: CPT

## 2020-09-28 ENCOUNTER — TELEPHONE (OUTPATIENT)
Dept: OTHER | Facility: OTHER | Age: 30
End: 2020-09-28

## 2020-09-28 NOTE — TELEPHONE ENCOUNTER
Form gotten off board behind nurse,  Had nurse fill out lab results,  Faxed form and lmm for yahaira top     Also is scanned on top into epic

## 2020-09-28 NOTE — TELEPHONE ENCOUNTER
Patient calling to find out if his physical paperwork was received and filled out  Please follow up with patient

## 2021-04-01 DIAGNOSIS — Z23 ENCOUNTER FOR IMMUNIZATION: ICD-10-CM

## 2021-04-07 ENCOUNTER — IMMUNIZATIONS (OUTPATIENT)
Dept: FAMILY MEDICINE CLINIC | Facility: HOSPITAL | Age: 31
End: 2021-04-07

## 2021-04-07 DIAGNOSIS — Z23 ENCOUNTER FOR IMMUNIZATION: Primary | ICD-10-CM

## 2021-04-07 PROCEDURE — 0001A SARS-COV-2 / COVID-19 MRNA VACCINE (PFIZER-BIONTECH) 30 MCG: CPT

## 2021-04-07 PROCEDURE — 91300 SARS-COV-2 / COVID-19 MRNA VACCINE (PFIZER-BIONTECH) 30 MCG: CPT

## 2021-04-28 ENCOUNTER — IMMUNIZATIONS (OUTPATIENT)
Dept: FAMILY MEDICINE CLINIC | Facility: HOSPITAL | Age: 31
End: 2021-04-28

## 2021-04-28 DIAGNOSIS — Z23 ENCOUNTER FOR IMMUNIZATION: Primary | ICD-10-CM

## 2021-04-28 PROCEDURE — 0002A SARS-COV-2 / COVID-19 MRNA VACCINE (PFIZER-BIONTECH) 30 MCG: CPT

## 2021-04-28 PROCEDURE — 91300 SARS-COV-2 / COVID-19 MRNA VACCINE (PFIZER-BIONTECH) 30 MCG: CPT

## 2021-09-20 ENCOUNTER — OFFICE VISIT (OUTPATIENT)
Dept: INTERNAL MEDICINE CLINIC | Facility: CLINIC | Age: 31
End: 2021-09-20
Payer: COMMERCIAL

## 2021-09-20 VITALS
HEIGHT: 72 IN | OXYGEN SATURATION: 97 % | DIASTOLIC BLOOD PRESSURE: 88 MMHG | WEIGHT: 212.4 LBS | BODY MASS INDEX: 28.77 KG/M2 | RESPIRATION RATE: 16 BRPM | HEART RATE: 97 BPM | SYSTOLIC BLOOD PRESSURE: 128 MMHG | TEMPERATURE: 98.2 F

## 2021-09-20 DIAGNOSIS — E78.49 OTHER HYPERLIPIDEMIA: ICD-10-CM

## 2021-09-20 DIAGNOSIS — Z00.00 PHYSICAL EXAM, ANNUAL: Primary | ICD-10-CM

## 2021-09-20 PROCEDURE — 1036F TOBACCO NON-USER: CPT | Performed by: NURSE PRACTITIONER

## 2021-09-20 PROCEDURE — 3725F SCREEN DEPRESSION PERFORMED: CPT | Performed by: NURSE PRACTITIONER

## 2021-09-20 PROCEDURE — 3008F BODY MASS INDEX DOCD: CPT | Performed by: NURSE PRACTITIONER

## 2021-09-20 PROCEDURE — 99395 PREV VISIT EST AGE 18-39: CPT | Performed by: NURSE PRACTITIONER

## 2021-09-20 RX ORDER — SULFAMETHOXAZOLE AND TRIMETHOPRIM 800; 160 MG/1; MG/1
2 TABLET ORAL EVERY MORNING
COMMUNITY
Start: 2021-08-12 | End: 2021-09-20 | Stop reason: ALTCHOICE

## 2021-09-20 RX ORDER — TRETINOIN 1 MG/G
CREAM TOPICAL
COMMUNITY
Start: 2021-01-14 | End: 2021-09-20 | Stop reason: ALTCHOICE

## 2021-09-20 RX ORDER — ALPRAZOLAM 0.25 MG/1
TABLET ORAL
COMMUNITY
Start: 2021-07-19 | End: 2021-09-20 | Stop reason: ALTCHOICE

## 2021-09-20 NOTE — PROGRESS NOTES
Assessment/Plan:    Physical exam completed for work  LDL was elevated last year, awaiting lab results  Diagnoses and all orders for this visit:    Physical exam, annual    Other hyperlipidemia    Other orders  -     Discontinue: ALPRAZolam (XANAX) 0 25 mg tablet; take 1 tablet by mouth once daily if needed for MAY TAKE WITH CLONAZEPAM (Patient not taking: Reported on 9/20/2021)  -     Sulfacetamide Sodium-Sulfur 10-5 % EMUL; apply to affected area ON THE BACK, CHEST, AND SCALP THREE TIMES      (REFER TO PRESCRIPTION NOTES)  -     Discontinue: sulfamethoxazole-trimethoprim (BACTRIM DS) 800-160 mg per tablet; Take 2 tablets by mouth every morning (Patient not taking: Reported on 9/20/2021)  -     Discontinue: tretinoin (RETIN-A) 0 1 % cream; Apply topically (Patient not taking: Reported on 9/20/2021)    The patient was counseled regarding instructions for management, risk factor reductions, patient and family education,impressions, risks and benefits of treatment options, side effects of medications, importance of compliance with treatment  The treatment plan was reviewed with the patient/guardian and patient/guardian understands and agrees with the treatment plan  Current Outpatient Medications:     ADDERALL XR, 25MG, 25 MG 24 hr capsule, 25 mg every morning, Disp: , Rfl: 0    QUEtiapine (SEROquel) 25 mg tablet, Take 12 5-25 mg by mouth daily at bedtime, Disp: , Rfl: 0    Sulfacetamide Sodium-Sulfur 10-5 % EMUL, apply to affected area ON THE BACK, CHEST, AND SCALP THREE TIMES      (REFER TO PRESCRIPTION NOTES)  , Disp: , Rfl:     Subjective:      Patient ID: Edmundo Franklin is a 31 y o  male  Here for physical exam for work  He has gained a little weight  He does eat a lot of red meat and eats snacks in the middle of the night  The following portions of the patient's history were reviewed and updated as appropriate:   He has a past medical history of No known problems  ,  does not have any pertinent problems on file  ,   has a past surgical history that includes Shoulder surgery (03/27/2015)  ,  family history includes Arthritis in his mother; Diabetes in his father; Hyperlipidemia in his father; Hypertension in his father  ,   reports that he has never smoked  He has quit using smokeless tobacco   His smokeless tobacco use included chew  He reports current alcohol use  He reports that he does not use drugs  ,  has No Known Allergies       Review of Systems   Constitutional: Negative  Negative for chills and fever  HENT: Negative for ear pain, rhinorrhea, sinus pain and sore throat  Eyes: Negative for redness and visual disturbance  Respiratory: Negative  Negative for cough, chest tightness and shortness of breath  Cardiovascular: Negative  Negative for chest pain and palpitations  Gastrointestinal: Negative for abdominal pain, constipation, diarrhea, nausea and vomiting  Endocrine: Negative  Genitourinary: Negative for dysuria, frequency and urgency  Musculoskeletal: Negative  Negative for arthralgias, back pain and myalgias  Skin: Negative for rash  Neurological: Negative for dizziness, numbness and headaches  Psychiatric/Behavioral: Negative  Objective:  /88 (BP Location: Left arm, Patient Position: Sitting, Cuff Size: Adult)   Pulse 97   Temp 98 2 °F (36 8 °C) (Tympanic)   Resp 16   Ht 6' (1 829 m)   Wt 96 3 kg (212 lb 6 4 oz)   SpO2 97%   BMI 28 81 kg/m²     Lab Review  No visits with results within 2 Month(s) from this visit     Latest known visit with results is:   Appointment on 09/16/2020   Component Date Value    Cholesterol 09/16/2020 239*    Triglycerides 09/16/2020 55     HDL, Direct 09/16/2020 47     LDL Calculated 09/16/2020 181*    Sodium 09/16/2020 139     Potassium 09/16/2020 3 9     Chloride 09/16/2020 100     CO2 09/16/2020 32     ANION GAP 09/16/2020 7     BUN 09/16/2020 14     Creatinine 09/16/2020 1 32*    Glucose, Fasting 09/16/2020 110*    Calcium 09/16/2020 9 5     AST 09/16/2020 41     ALT 09/16/2020 48     Alkaline Phosphatase 09/16/2020 86     Total Protein 09/16/2020 8 5*    Albumin 09/16/2020 4 7     Total Bilirubin 09/16/2020 0 80     eGFR 09/16/2020 73         Imaging: No results found  Physical Exam  Constitutional:       Appearance: He is well-developed  HENT:      Head: Normocephalic and atraumatic  Right Ear: External ear normal       Left Ear: External ear normal       Nose: Nose normal    Eyes:      General: Lids are normal       Conjunctiva/sclera: Conjunctivae normal       Pupils: Pupils are equal, round, and reactive to light  Neck:      Thyroid: No thyroid mass  Vascular: No carotid bruit  Cardiovascular:      Rate and Rhythm: Normal rate and regular rhythm  Heart sounds: Normal heart sounds  Pulmonary:      Effort: Pulmonary effort is normal       Breath sounds: Normal breath sounds  Abdominal:      General: Bowel sounds are normal       Palpations: Abdomen is soft  Musculoskeletal:         General: Normal range of motion  Cervical back: Normal range of motion and neck supple  Skin:     General: Skin is warm and dry  Neurological:      Mental Status: He is alert and oriented to person, place, and time  Deep Tendon Reflexes: Reflexes are normal and symmetric     Psychiatric:         Speech: Speech normal          Behavior: Behavior normal

## 2022-04-27 ENCOUNTER — OFFICE VISIT (OUTPATIENT)
Dept: INTERNAL MEDICINE CLINIC | Facility: CLINIC | Age: 32
End: 2022-04-27
Payer: COMMERCIAL

## 2022-04-27 VITALS
HEART RATE: 96 BPM | BODY MASS INDEX: 31.51 KG/M2 | WEIGHT: 232.6 LBS | RESPIRATION RATE: 18 BRPM | OXYGEN SATURATION: 97 % | HEIGHT: 72 IN | SYSTOLIC BLOOD PRESSURE: 130 MMHG | TEMPERATURE: 98.4 F | DIASTOLIC BLOOD PRESSURE: 76 MMHG

## 2022-04-27 DIAGNOSIS — R53.83 OTHER FATIGUE: Primary | ICD-10-CM

## 2022-04-27 PROCEDURE — 3725F SCREEN DEPRESSION PERFORMED: CPT | Performed by: NURSE PRACTITIONER

## 2022-04-27 PROCEDURE — 3008F BODY MASS INDEX DOCD: CPT | Performed by: NURSE PRACTITIONER

## 2022-04-27 PROCEDURE — 1036F TOBACCO NON-USER: CPT | Performed by: NURSE PRACTITIONER

## 2022-04-27 PROCEDURE — 99214 OFFICE O/P EST MOD 30 MIN: CPT | Performed by: NURSE PRACTITIONER

## 2022-04-27 RX ORDER — CLONAZEPAM 0.5 MG/1
0.5 TABLET ORAL 3 TIMES DAILY
COMMUNITY
Start: 2022-04-14 | End: 2022-04-27 | Stop reason: ALTCHOICE

## 2022-04-27 RX ORDER — ALPRAZOLAM 0.25 MG/1
TABLET ORAL
COMMUNITY
Start: 2022-04-21 | End: 2022-04-27 | Stop reason: ALTCHOICE

## 2022-04-27 NOTE — PROGRESS NOTES
BMI Counseling: Body mass index is 31 55 kg/m²  The BMI is above normal  Nutrition recommendations include decreasing portion sizes, encouraging healthy choices of fruits and vegetables, decreasing fast food intake, consuming healthier snacks, limiting drinks that contain sugar, moderation in carbohydrate intake, increasing intake of lean protein, reducing intake of saturated and trans fat and reducing intake of cholesterol  Exercise recommendations include exercising 3-5 times per week  No pharmacotherapy was ordered  Patient referred to PCP  Rationale for BMI follow-up plan is due to patient being overweight or obese  Depression Screening and Follow-up Plan: Patient was screened for depression during today's encounter  They screened negative with a PHQ-2 score of 0  Assessment/Plan:    1  Fatigue- Labs ordered, including Lyme and EBV  We will call you with results  2  Elevated blood pressure without the diagnosis of hypertension- Continue to monitor BP and  Make sure to let Dr Pat Larose, psychiatrist, know if it remains elevated as discussed  Follow up as needed  Diagnoses and all orders for this visit:    Other fatigue  -     Anaplasma Phagocytophilum, PCR; Future  -     CBC and differential; Future  -     Comprehensive metabolic panel; Future  -     EBV acute panel; Future  -     Lyme Total Antibody Profile with reflex to WB; Future  -     Vitamin B12; Future  -     Vitamin D 25 hydroxy; Future  -     Iron Panel (Includes Ferritin, Iron Sat%, Iron, and TIBC);  Future  -     Anaplasma Phagocytophilum, PCR  -     CBC and differential  -     Comprehensive metabolic panel  -     EBV acute panel  -     Lyme Total Antibody Profile with reflex to WB  -     Vitamin B12  -     Vitamin D 25 hydroxy    Other orders  -     Discontinue: ALPRAZolam (XANAX) 0 25 mg tablet; take 1 tablet by mouth daily if needed -MAY TAKE WITH CLONAZEPAM (Patient not taking: Reported on 4/27/2022)  -     Discontinue: clonazePAM (KlonoPIN) 0 5 mg tablet; Take 0 5 mg by mouth 3 (three) times a day (Patient not taking: Reported on 4/27/2022 )        The patient was counseled regarding instructions for management, risk factor reductions, patient and family education,impressions, risks and benefits of treatment options, side effects of medications, importance of compliance with treatment  The treatment plan was reviewed with the patient/guardian and patient/guardian understands and agrees with the treatment plan  Current Outpatient Medications:     ADDERALL XR, 25MG, 25 MG 24 hr capsule, 25 mg every morning, Disp: , Rfl: 0    QUEtiapine (SEROquel) 25 mg tablet, Take 12 5-25 mg by mouth daily at bedtime, Disp: , Rfl: 0    Sulfacetamide Sodium-Sulfur 10-5 % EMUL, apply to affected area ON THE BACK, CHEST, AND SCALP THREE TIMES      (REFER TO PRESCRIPTION NOTES)  , Disp: , Rfl:     Subjective:      Patient ID: Jose Manuel Mitchell is a 36 y o  male  Past two months, he has been extremely fatigued, sleeping ten hours at a time, with naps at times  The following portions of the patient's history were reviewed and updated as appropriate:   He has a past medical history of No known problems  ,  does not have any pertinent problems on file  ,   has a past surgical history that includes Shoulder surgery (03/27/2015)  ,  family history includes Arthritis in his mother; Diabetes in his father; Hyperlipidemia in his father; Hypertension in his father  ,   reports that he has never smoked  He has quit using smokeless tobacco   His smokeless tobacco use included chew  He reports current alcohol use  He reports that he does not use drugs  ,  has No Known Allergies       Review of Systems   Constitutional: Positive for fatigue  Respiratory: Negative  Cardiovascular: Negative  Musculoskeletal: Negative  Psychiatric/Behavioral: Negative            Objective:  /76 (BP Location: Left arm, Patient Position: Sitting, Cuff Size: Large)   Pulse 96 Temp 98 4 °F (36 9 °C) (Tympanic)   Resp 18   Ht 6' (1 829 m)   Wt 106 kg (232 lb 9 6 oz)   SpO2 97%   BMI 31 55 kg/m²     Lab Review  No visits with results within 2 Month(s) from this visit  Latest known visit with results is:   Appointment on 09/16/2020   Component Date Value    Cholesterol 09/16/2020 239*    Triglycerides 09/16/2020 55     HDL, Direct 09/16/2020 47     LDL Calculated 09/16/2020 181*    Sodium 09/16/2020 139     Potassium 09/16/2020 3 9     Chloride 09/16/2020 100     CO2 09/16/2020 32     ANION GAP 09/16/2020 7     BUN 09/16/2020 14     Creatinine 09/16/2020 1 32*    Glucose, Fasting 09/16/2020 110*    Calcium 09/16/2020 9 5     AST 09/16/2020 41     ALT 09/16/2020 48     Alkaline Phosphatase 09/16/2020 86     Total Protein 09/16/2020 8 5*    Albumin 09/16/2020 4 7     Total Bilirubin 09/16/2020 0 80     eGFR 09/16/2020 73         Imaging: No results found  Physical Exam  Constitutional:       Appearance: He is well-developed  Cardiovascular:      Rate and Rhythm: Normal rate and regular rhythm  Heart sounds: Normal heart sounds  Pulmonary:      Effort: Pulmonary effort is normal       Breath sounds: Normal breath sounds  Musculoskeletal:         General: Normal range of motion  Neurological:      Mental Status: He is alert and oriented to person, place, and time  Deep Tendon Reflexes: Reflexes are normal and symmetric  Psychiatric:         Behavior: Behavior normal          Thought Content:  Thought content normal          Judgment: Judgment normal

## 2022-04-27 NOTE — PATIENT INSTRUCTIONS
1  Fatigue- Labs ordered, including Lyme and EBV  We will call you with results  2  Elevated blood pressure without the diagnosis of hypertension- Continue to monitor BP and  Make sure to let Dr Hemanth Collins, psychiatrist, know if it remains elevated as discussed  Follow up as needed

## 2022-05-05 ENCOUNTER — APPOINTMENT (OUTPATIENT)
Dept: LAB | Facility: HOSPITAL | Age: 32
End: 2022-05-05
Payer: COMMERCIAL

## 2022-05-05 DIAGNOSIS — R53.83 OTHER FATIGUE: Primary | ICD-10-CM

## 2022-05-05 LAB
25(OH)D3 SERPL-MCNC: 27.6 NG/ML (ref 30–100)
ALBUMIN SERPL BCP-MCNC: 4.7 G/DL (ref 3.5–5)
ALP SERPL-CCNC: 88 U/L (ref 46–116)
ALT SERPL W P-5'-P-CCNC: 52 U/L (ref 12–78)
ANION GAP SERPL CALCULATED.3IONS-SCNC: 9 MMOL/L (ref 4–13)
AST SERPL W P-5'-P-CCNC: 45 U/L (ref 5–45)
BASOPHILS # BLD AUTO: 0.04 THOUSANDS/ΜL (ref 0–0.1)
BASOPHILS NFR BLD AUTO: 1 % (ref 0–1)
BILIRUB SERPL-MCNC: 0.91 MG/DL (ref 0.2–1)
BUN SERPL-MCNC: 15 MG/DL (ref 5–25)
CALCIUM SERPL-MCNC: 9.1 MG/DL (ref 8.3–10.1)
CHLORIDE SERPL-SCNC: 99 MMOL/L (ref 100–108)
CO2 SERPL-SCNC: 27 MMOL/L (ref 21–32)
CREAT SERPL-MCNC: 1.12 MG/DL (ref 0.6–1.3)
EOSINOPHIL # BLD AUTO: 0.35 THOUSAND/ΜL (ref 0–0.61)
EOSINOPHIL NFR BLD AUTO: 7 % (ref 0–6)
ERYTHROCYTE [DISTWIDTH] IN BLOOD BY AUTOMATED COUNT: 13 % (ref 11.6–15.1)
FERRITIN SERPL-MCNC: 232 NG/ML (ref 8–388)
GFR SERPL CREATININE-BSD FRML MDRD: 88 ML/MIN/1.73SQ M
GLUCOSE SERPL-MCNC: 102 MG/DL (ref 65–140)
HCT VFR BLD AUTO: 42.7 % (ref 36.5–49.3)
HGB BLD-MCNC: 14.9 G/DL (ref 12–17)
IMM GRANULOCYTES # BLD AUTO: 0.01 THOUSAND/UL (ref 0–0.2)
IMM GRANULOCYTES NFR BLD AUTO: 0 % (ref 0–2)
IRON SATN MFR SERPL: 17 % (ref 20–50)
IRON SERPL-MCNC: 71 UG/DL (ref 65–175)
LYMPHOCYTES # BLD AUTO: 1.8 THOUSANDS/ΜL (ref 0.6–4.47)
LYMPHOCYTES NFR BLD AUTO: 34 % (ref 14–44)
MCH RBC QN AUTO: 30 PG (ref 26.8–34.3)
MCHC RBC AUTO-ENTMCNC: 34.9 G/DL (ref 31.4–37.4)
MCV RBC AUTO: 86 FL (ref 82–98)
MONOCYTES # BLD AUTO: 0.41 THOUSAND/ΜL (ref 0.17–1.22)
MONOCYTES NFR BLD AUTO: 8 % (ref 4–12)
NEUTROPHILS # BLD AUTO: 2.68 THOUSANDS/ΜL (ref 1.85–7.62)
NEUTS SEG NFR BLD AUTO: 50 % (ref 43–75)
NRBC BLD AUTO-RTO: 0 /100 WBCS
PLATELET # BLD AUTO: 246 THOUSANDS/UL (ref 149–390)
PMV BLD AUTO: 9.9 FL (ref 8.9–12.7)
POTASSIUM SERPL-SCNC: 3.5 MMOL/L (ref 3.5–5.3)
PROT SERPL-MCNC: 7.9 G/DL (ref 6.4–8.2)
RBC # BLD AUTO: 4.97 MILLION/UL (ref 3.88–5.62)
SODIUM SERPL-SCNC: 135 MMOL/L (ref 136–145)
TIBC SERPL-MCNC: 412 UG/DL (ref 250–450)
VIT B12 SERPL-MCNC: 470 PG/ML (ref 100–900)
WBC # BLD AUTO: 5.29 THOUSAND/UL (ref 4.31–10.16)

## 2022-05-05 PROCEDURE — 87798 DETECT AGENT NOS DNA AMP: CPT

## 2022-05-05 PROCEDURE — 82607 VITAMIN B-12: CPT

## 2022-05-05 PROCEDURE — 80053 COMPREHEN METABOLIC PANEL: CPT

## 2022-05-05 PROCEDURE — 86665 EPSTEIN-BARR CAPSID VCA: CPT

## 2022-05-05 PROCEDURE — 82728 ASSAY OF FERRITIN: CPT

## 2022-05-05 PROCEDURE — 83540 ASSAY OF IRON: CPT

## 2022-05-05 PROCEDURE — 86618 LYME DISEASE ANTIBODY: CPT

## 2022-05-05 PROCEDURE — 86664 EPSTEIN-BARR NUCLEAR ANTIGEN: CPT

## 2022-05-05 PROCEDURE — 82306 VITAMIN D 25 HYDROXY: CPT

## 2022-05-05 PROCEDURE — 83550 IRON BINDING TEST: CPT

## 2022-05-05 PROCEDURE — 85025 COMPLETE CBC W/AUTO DIFF WBC: CPT

## 2022-05-05 PROCEDURE — 36415 COLL VENOUS BLD VENIPUNCTURE: CPT

## 2022-05-05 PROCEDURE — 86663 EPSTEIN-BARR ANTIBODY: CPT

## 2022-05-06 LAB
B BURGDOR IGG+IGM SER-ACNC: 15
EBV NA IGG SER IA-ACNC: 62 U/ML (ref 0–17.9)
EBV VCA IGG SER IA-ACNC: 572 U/ML (ref 0–17.9)
EBV VCA IGM SER IA-ACNC: <36 U/ML (ref 0–35.9)
INTERPRETATION: ABNORMAL

## 2022-05-09 PROBLEM — E61.1 LOW SERUM IRON: Status: ACTIVE | Noted: 2022-05-09

## 2022-05-09 PROBLEM — E55.9 VITAMIN D DEFICIENCY: Status: ACTIVE | Noted: 2022-05-09

## 2022-05-11 LAB — A PHAGOCYTOPH DNA BLD QL NAA+PROBE: NEGATIVE

## 2022-05-12 ENCOUNTER — TELEPHONE (OUTPATIENT)
Dept: HEMATOLOGY ONCOLOGY | Facility: CLINIC | Age: 32
End: 2022-05-12

## 2022-06-06 ENCOUNTER — TELEPHONE (OUTPATIENT)
Dept: HEMATOLOGY ONCOLOGY | Facility: CLINIC | Age: 32
End: 2022-06-06

## 2022-06-06 NOTE — TELEPHONE ENCOUNTER
New Patient Intake Form   Patient Details:    Chandni Hitchcock  1990    Appointment Information   Who is calling to schedule? Patient   If not self, what is the caller's name? Please put name of RBC nurse as well  DID YOU CONFIRM INSURANCE WITH PATIENT? Yes    Referring provider KIM Lopez   What is the diagnosis? Low serum iron     Is there a confirmed tissue diagnosis? No   Is there a biopsy ordered or pending? Please specify dates   n/a     Is patient aware of diagnosis? Yes   Have you had any imaging or labs done? If yes, where? (If imaging done outside of [facility], please remind patient to bring a disk ) Yes     05/05     If imaging done at outside facility, did you instruct patient to obtain discs and bring to visit? n/a   Have you been seen by another Oncologist/Hematologist?  If so, who and where? no   Are the records in [facility] or Care Everywhere? yes   Does the patient have records at another facility/hospital? no   If yes, Name of facility, city and state where facility is located  Did you instruct patient to have records faxed to [facility] and provide rightfax number? n/a   Preferred [facility]   Is the patient willing to be seen by another provider? (This is for breast patients only) n/a     Did you send new patient paperwork?   Email or mail? no   Miscellaneous Information: appt made for 06/14

## 2022-09-07 ENCOUNTER — TELEPHONE (OUTPATIENT)
Dept: HEMATOLOGY ONCOLOGY | Facility: CLINIC | Age: 32
End: 2022-09-07

## 2022-09-07 NOTE — TELEPHONE ENCOUNTER
Appointment Cancellation Or Reschedule     Person calling in [facility]    Provider MACKENZIE BROWN [facility]   Office Visit Date and Time 6/14/22 @ 2pm   Office Visit Location [facility]   Did patient want to reschedule their office appointment? If so, when was it scheduled to? Yes 10/7/22 @ 2pm   Did you have STAR scheduled for this appointment? no   Do you need STAR set up for your new appointment? If yes, please send to "PATIENT RIDESHARE" pool for STAR rescheduling no   If you are cancelling appointment, can we notify STAR to cancel ride? If yes, please send to "PATIENT RIDESHARE" pool for STAR to cancel service na   Is this patient calling to reschedule an infusion appointment? no   When is their next infusion appointment? na   Is this patient a Chemo patient? no   Reason for Cancellation or Reschedule Patient was unable to keep appt due to work schedule     If the patient is a treatment patient, please route this to the office nurse  If the patient is not on treatment, please route to the office MA  If the patient is a surgical oncology patient, please route to surg/onc clinical pool

## 2022-09-26 PROBLEM — L73.0 ACNE KELOID: Status: RESOLVED | Noted: 2017-12-05 | Resolved: 2022-09-26

## 2022-09-26 PROBLEM — R53.83 OTHER FATIGUE: Status: RESOLVED | Noted: 2022-04-27 | Resolved: 2022-09-26

## 2022-09-26 PROBLEM — J01.10 ACUTE NON-RECURRENT FRONTAL SINUSITIS: Status: RESOLVED | Noted: 2018-03-16 | Resolved: 2022-09-26

## 2022-09-26 PROBLEM — J01.91 ACUTE RECURRENT SINUSITIS: Status: RESOLVED | Noted: 2018-04-11 | Resolved: 2022-09-26

## 2022-09-26 PROBLEM — R05.9 COUGH: Status: RESOLVED | Noted: 2019-12-19 | Resolved: 2022-09-26

## 2022-10-11 ENCOUNTER — TELEPHONE (OUTPATIENT)
Dept: HEMATOLOGY ONCOLOGY | Facility: CLINIC | Age: 32
End: 2022-10-11

## 2022-10-11 NOTE — TELEPHONE ENCOUNTER
Called and let message on machine asking for a call back to [phone] for patient to reschedule his appointment with MACKENZIE BROWN [facility] as he needs to have updated labs done for this appointment  Last labs were in May 2022  Lab orders for CBC and Iron Panel have been placed in Psychiatric  Patient's appointment should be rescheduled for roughly 7-10 days after his blood work will be done

## 2022-10-12 ENCOUNTER — TELEPHONE (OUTPATIENT)
Dept: HEMATOLOGY ONCOLOGY | Facility: CLINIC | Age: 32
End: 2022-10-12

## 2022-10-12 NOTE — TELEPHONE ENCOUNTER
Appointment Cancellation Or Reschedule     Person calling in Patient    If other than patient calling, are they listed on the communication consent form? na   Provider Alhaji   Office Visit Date and Time  10/12/22 @ 2pm   Office Visit Location [place]   Did patient want to reschedule their office appointment? If so, when was it scheduled to? Yes 11/8/22 @ 2pm   Did you have STAR scheduled for this appointment? no   Do you need STAR set up for your new appointment? If yes, please send to "PATIENT RIDESHARE" pool for STAR rescheduling no   If you are cancelling appointment, can we notify STAR to cancel ride? If yes, please send to "PATIENT RIDESHARE" pool for STAR to cancel service na   Is this patient calling to reschedule an infusion appointment? no   When is their next infusion appointment? na   Is this patient a Chemo patient? no   Reason for Cancellation or Reschedule patiient needed to reschedule needed updated lab orders  I scheduled first available appointment 11/8/22 @ 2pm        If the patient is a treatment patient, please route this to the office nurse  If the patient is not on treatment, please route to the office MA  If the patient is a surgical oncology patient, please route to surg/onc clinical pool

## 2022-10-31 ENCOUNTER — APPOINTMENT (OUTPATIENT)
Dept: LAB | Facility: HOSPITAL | Age: 32
End: 2022-10-31

## 2022-10-31 DIAGNOSIS — D50.9 IRON DEFICIENCY ANEMIA, UNSPECIFIED IRON DEFICIENCY ANEMIA TYPE: Primary | ICD-10-CM

## 2022-10-31 LAB
ALBUMIN SERPL BCP-MCNC: 4.2 G/DL (ref 3.5–5)
ALP SERPL-CCNC: 92 U/L (ref 46–116)
ALT SERPL W P-5'-P-CCNC: 51 U/L (ref 12–78)
ANION GAP SERPL CALCULATED.3IONS-SCNC: 10 MMOL/L (ref 4–13)
AST SERPL W P-5'-P-CCNC: 30 U/L (ref 5–45)
BASOPHILS # BLD AUTO: 0.05 THOUSANDS/ÂΜL (ref 0–0.1)
BASOPHILS NFR BLD AUTO: 1 % (ref 0–1)
BILIRUB SERPL-MCNC: 0.63 MG/DL (ref 0.2–1)
BUN SERPL-MCNC: 14 MG/DL (ref 5–25)
CALCIUM SERPL-MCNC: 9 MG/DL (ref 8.3–10.1)
CHLORIDE SERPL-SCNC: 104 MMOL/L (ref 96–108)
CO2 SERPL-SCNC: 29 MMOL/L (ref 21–32)
CREAT SERPL-MCNC: 1.17 MG/DL (ref 0.6–1.3)
EOSINOPHIL # BLD AUTO: 0.97 THOUSAND/ÂΜL (ref 0–0.61)
EOSINOPHIL NFR BLD AUTO: 16 % (ref 0–6)
ERYTHROCYTE [DISTWIDTH] IN BLOOD BY AUTOMATED COUNT: 12.7 % (ref 11.6–15.1)
FERRITIN SERPL-MCNC: 232 NG/ML (ref 8–388)
GFR SERPL CREATININE-BSD FRML MDRD: 83 ML/MIN/1.73SQ M
GLUCOSE P FAST SERPL-MCNC: 123 MG/DL (ref 65–99)
HCT VFR BLD AUTO: 44.2 % (ref 36.5–49.3)
HGB BLD-MCNC: 15.5 G/DL (ref 12–17)
IMM GRANULOCYTES # BLD AUTO: 0.01 THOUSAND/UL (ref 0–0.2)
IMM GRANULOCYTES NFR BLD AUTO: 0 % (ref 0–2)
IRON SATN MFR SERPL: 26 % (ref 20–50)
IRON SERPL-MCNC: 97 UG/DL (ref 65–175)
LYMPHOCYTES # BLD AUTO: 2.08 THOUSANDS/ÂΜL (ref 0.6–4.47)
LYMPHOCYTES NFR BLD AUTO: 35 % (ref 14–44)
MCH RBC QN AUTO: 30.3 PG (ref 26.8–34.3)
MCHC RBC AUTO-ENTMCNC: 35.1 G/DL (ref 31.4–37.4)
MCV RBC AUTO: 86 FL (ref 82–98)
MONOCYTES # BLD AUTO: 0.41 THOUSAND/ÂΜL (ref 0.17–1.22)
MONOCYTES NFR BLD AUTO: 7 % (ref 4–12)
NEUTROPHILS # BLD AUTO: 2.47 THOUSANDS/ÂΜL (ref 1.85–7.62)
NEUTS SEG NFR BLD AUTO: 41 % (ref 43–75)
NRBC BLD AUTO-RTO: 0 /100 WBCS
PLATELET # BLD AUTO: 220 THOUSANDS/UL (ref 149–390)
PMV BLD AUTO: 10.2 FL (ref 8.9–12.7)
POTASSIUM SERPL-SCNC: 3.8 MMOL/L (ref 3.5–5.3)
PROT SERPL-MCNC: 7.6 G/DL (ref 6.4–8.4)
RBC # BLD AUTO: 5.12 MILLION/UL (ref 3.88–5.62)
SODIUM SERPL-SCNC: 143 MMOL/L (ref 135–147)
TIBC SERPL-MCNC: 369 UG/DL (ref 250–450)
WBC # BLD AUTO: 5.99 THOUSAND/UL (ref 4.31–10.16)

## 2022-10-31 NOTE — PROGRESS NOTES
Received vm, "Hi, this is Jenae from [facility] outpatient lab  I was calling in regards to one of Maribel's patients  The patient's name is Daria Simons and the last name is Bernard Mak  Date of birth is [id number]  He was looking for a labs that need to be completed  He has an appointment with Stacey Coates next Tuesday  The number here is [phone]  Thank you "     Reviewed labs, patient was not seen by Jamal Cisneros PA-C, yet  CBCD, CMP and Iron Panel ordered  Call back to [facility] lab, spoke with Marcie Hough  Reviewed patient left and will nish [facility] lab and return to have labs completed

## 2022-11-07 NOTE — PROGRESS NOTES
HEMATOLOGY CLINIC NOTE    Primary Care Provider: KIM Landry  Referring Provider: Zach Eastman  MRN: [id number]  : 1990    Assessment / Plan:   1  Fatigue, unspecified type  2  Decreased iron stores  This is a 42-year-old male who had fatigue symptom back in May of this year  As a result, he underwent lab workup which showed iron panel iron saturation 17%  Rest of iron panel was completely normal   He had a CBC with differential which was also completely unrevealing  He was sent to our clinic to see if iron saturation being mildly low was cause of fatigue  I do not believe iron saturation being this mildly low would cause his fatigue  I do not believe patient would benefit from any additional iron treatment or monitoring of his labs regarding his iron panel from our standpoint  However, he has had a history of low vitamin-D in the past which I will repeat within the next month to see if he would require more vitamin-D supplementation  He has been taking over-the-counter vitamin-D  If he requires more vitamin-D, he will contact his PCP     - Vitamin D 25 hydroxy; Future    3  Other eosinophilia  Patient had mild eosinophilia on CBC with differential 10/31/2022  It was not seen previously  Likely reactive  We will repeat this with next blood draw  If continues to be elevated, may consider further workup  - CBC and differential; Future    · Discussion of decision making    I personally reviewed the following lab results, the image studies, pathology, other specialty/physicians consult notes and recommendations, and outside medical records from [address] [place] [address]  I had a lengthy discussion with the patient and shared the work-up findings   I spent 30 minutes reviewing the records (labs, clinician notes, outside records, medical history, ordering medicine/tests/procedures, interpreting the imaging/labs previously done) and coordination of care as well as direct time with the patient today, of which greater than 50% of the time was spent in counseling and coordination of care with the patient/family  · Plan/Labs  · As above, patient does not require any additional iron supplementation  We do not need to monitor his iron panel from our standpoint as it was largely unrevealing  I do not believe his iron panel having mildly low iron saturation at 17% would be the cause of his fatigue  He does have low vitamin-D level in the past which I will repeat soon as he has been taking over-the-counter vitamin-D  If this is still low, he will contact his PCP  · We will also repeat CBC with differential shortly to ensure eosinophils return back to normal   If not, may consider further workup at that time  Follow Up: TBD  However, likely will be PRN    All questions were answered to the patient's satisfaction during this encounter  The patient knows the contact information for our office and knows to reach out for any relevant concerns related to this encounter  They are to call for any temperature 100 4 or higher, new symptoms including but not restricted to shaking chills, decreased appetite, nausea, vomiting, diarrhea, increased fatigue, shortness of breath or chest pain, confusion, and not feeling the strength to come to the clinic  For all other listed problems and medical diagnosis in their chart - they are managed by PCP and/or other specialists, which the patient acknowledges  Thank you very much for your consultation and making us a part of this patient's care  We are continuing to follow closely with you  Please do not hesitate to reach out to me with any additional questions or concerns  Reason for visit:       Chief Complaint   Patient presents with   • Consult       History of Hematology Illness:     Soledad Pollard is a 29 y o  male who came in for consultation  1  History of low iron saturation   - 5/2022: Ferritin 232, iron 71, TIBC 412, iron sat 17%   CBC-D showed Hgb 14 9, MCV 86, rest of CBC-D acceptable  Patient began once daily ferrous sulfate      - 10/31/2022: WBC 5 99, Hgb 15 5, MCV 86, PLT 220K, abs eosinophilia 0 97, rest of diff acceptable  Ferritin 232, iron 97, TIBC 369, iron sat 26%    2  History of low Vitamin D  - 5/2022: Vitamin D 27 6  Patient began Vitamin D OTC    - Has not been recently retested  3  Elevated eosinophils  - as above  Seen on only 1 test from 10/31/22  Interval History:   11/08/22: This is a 53-year-old male with past medical history of chronic rhinitis, herniation of cervical disc, cervical radiculopathy, pityriasis rosea, hyperlipidemia, and more presenting for consultation  Patient iron panel completely normal  Still has some fatigue  However, no hx of cancer  Does not smoke or drink  He has paternal grandmother with history of lung cancer  Otherwise no other hx in family  No constitutional symptoms, frequent infections  Problem list:       Patient Active Problem List   Diagnosis   • Abnormal AST and ALT   • ADHD, predominantly inattentive type   • Anxiety   • Chronic rhinitis   • Diffuse cervicobrachial syndrome   • Pityriasis rosea   • Herniation of cervical intervertebral disc with radiculopathy   • Cervical radiculopathy   • Healthcare maintenance   • Pseudofolliculitis barbae   • Drug screening, pre-employment   • Screening cholesterol level   • Screening for diabetes mellitus   • Physical exam, annual   • Other hyperlipidemia   • Vitamin D deficiency   • Low serum iron       REVIEW OF SYMPTOMS:   Review of Systems   Constitutional: Positive for fatigue  Negative for activity change, appetite change, chills, diaphoresis, fever and unexpected weight change  HENT: Negative for nosebleeds  Respiratory: Negative for apnea, cough, chest tightness and shortness of breath  Cardiovascular: Negative for chest pain, palpitations and leg swelling     Gastrointestinal: Negative for abdominal distention, abdominal pain, anal bleeding, blood in stool, constipation, diarrhea, nausea and vomiting  Endocrine: Negative for cold intolerance  Genitourinary: Negative for hematuria  Skin: Negative for color change and pallor  Neurological: Negative for dizziness, weakness, light-headedness and headaches  Hematological: Negative for adenopathy  Does not bruise/bleed easily  PHYSICAL EXAMINATION:     Vital Signs:   /80   Pulse 97   Temp 98 °F (36 7 °C)   Resp 16   Ht 6' (1 829 m)   Wt 103 kg (226 lb 8 oz)   SpO2 98%   BMI 30 72 kg/m²   Body surface area is 2 25 meters squared  Ht Readings from Last 8 Encounters:   11/08/22 6' (1 829 m)   04/27/22 6' (1 829 m)   09/20/21 6' (1 829 m)   09/04/20 6' (1 829 m)   12/19/19 6' (1 829 m)   05/13/19 5' 10" (1 778 m)   02/25/19 5' 10" (1 778 m)   12/19/18 5' 10" (1 778 m)       Wt Readings from Last 8 Encounters:   11/08/22 103 kg (226 lb 8 oz)   04/27/22 106 kg (232 lb 9 6 oz)   09/20/21 96 3 kg (212 lb 6 4 oz)   09/04/20 93 9 kg (207 lb)   12/19/19 87 5 kg (193 lb)   05/13/19 91 3 kg (201 lb 3 2 oz)   02/25/19 87 1 kg (192 lb)   12/19/18 87 1 kg (192 lb)          Physical Exam  Constitutional:       General: He is not in acute distress  Appearance: Normal appearance  He is normal weight  He is not ill-appearing, toxic-appearing or diaphoretic  HENT:      Head: Normocephalic and atraumatic  Eyes:      General: No scleral icterus  Extraocular Movements: Extraocular movements intact  Conjunctiva/sclera: Conjunctivae normal    Cardiovascular:      Rate and Rhythm: Normal rate and regular rhythm  Pulmonary:      Effort: Pulmonary effort is normal  No respiratory distress  Abdominal:      Palpations: Abdomen is soft  Tenderness: There is no abdominal tenderness  Musculoskeletal:      Cervical back: Normal range of motion and neck supple  Right lower leg: No edema  Left lower leg: No edema  Skin:     General: Skin is warm and dry        Coloration: Skin is not jaundiced or pale  Findings: No bruising, erythema, lesion or rash  Neurological:      General: No focal deficit present  Mental Status: He is alert and oriented to person, place, and time  Mental status is at baseline  Motor: No weakness  Psychiatric:         Mood and Affect: Mood normal          Behavior: Behavior normal          Thought Content: Thought content normal          Judgment: Judgment normal        Reviewed historical information  PAST MEDICAL HISTORY:    Past Medical History:   Diagnosis Date   • No known problems        PAST SURGICAL HISTORY:    Past Surgical History:   Procedure Laterality Date   • SHOULDER SURGERY  03/27/2015         CURRENT MEDICATIONS:     Current Outpatient Medications:   •  ADDERALL XR, 25MG, 25 MG 24 hr capsule, 25 mg every morning, Disp: , Rfl: 0  •  ALPRAZolam (XANAX) 0 25 mg tablet, take 1 tablet by mouth daily if needed (MAY TAKE WITH CLONAZEPAM), Disp: , Rfl:   •  Claravis 20 MG capsule, , Disp: , Rfl:   •  clonazePAM (KlonoPIN) 0 5 mg tablet, , Disp: , Rfl:   •  Ergocalciferol 50 MCG (2000 UT) CAPS, Take 1 capsule by mouth in the morning, Disp: 90 capsule, Rfl: 0  •  escitalopram (LEXAPRO) 20 mg tablet, , Disp: , Rfl:   •  QUEtiapine (SEROquel) 25 mg tablet, Take 12 5-25 mg by mouth daily at bedtime, Disp: , Rfl: 0  •  Sulfacetamide Sodium-Sulfur 10-5 % EMUL, apply to affected area ON THE BACK, CHEST, AND SCALP THREE TIMES      (REFER TO PRESCRIPTION NOTES)   (Patient not taking: Reported on 11/8/2022), Disp: , Rfl:     SOCIAL HISTORY:    Social History     Tobacco Use   • Smoking status: Never Smoker   • Smokeless tobacco: Former User     Types: Chew   • Tobacco comment: Quit Jan 2016   Substance Use Topics   • Alcohol use: Yes     Comment: social   • Drug use: No       FAMILY HISTORY:    Family History   Problem Relation Age of Onset   • Arthritis Mother    • Diabetes Father    • Hypertension Father    • Hyperlipidemia Father ALLERGIES:  No Known Allergies      LAB:    Lab Results   Component Value Date    WBC 5 99 10/31/2022    HGB 15 5 10/31/2022    HCT 44 2 10/31/2022    MCV 86 10/31/2022     10/31/2022       Lab Results   Component Value Date     07/06/2017    SODIUM 143 10/31/2022    K 3 8 10/31/2022     10/31/2022    CO2 29 10/31/2022    AGAP 10 10/31/2022    BUN 14 10/31/2022    CREATININE 1 17 10/31/2022    GLUC 102 05/05/2022    GLUF 123 (H) 10/31/2022    CALCIUM 9 0 10/31/2022    AST 30 10/31/2022    ALT 51 10/31/2022    ALKPHOS 92 10/31/2022    PROT 7 8 07/19/2017    TP 7 6 10/31/2022    BILITOT 1 3 (H) 07/19/2017    TBILI 0 63 10/31/2022    EGFR 83 10/31/2022       IMAGING:  FL spine and pain procedure  Pre-procedure Diagnosis: Cervical Radiculopathy   Post-procedure Diagnosis: Cervical Radiculopathy   Procedure Title(s):  1  C6-C7 Translaminar epidural steroid injection      2  Intraoperative fluoroscopy  Attending Surgeon:   Marin Schafer MD  Anesthesia:   Local    Procedure Note:  The patient’s history and physical exam were reviewed  I explained the   details of the procedure to the patient including the risks, benefits and   other alternatives  We had an informed discussion and the patient   verbalized understanding and signed the consent form  All questions were   answered appropriately  The patient was taken to the Fluoroscopy Suite and placed prone on the   table with support and arms down at the sides  Prior to the procedure, a   "time out" was performed  Fluoroscopic guidance was used to locate the   anatomic landmarks for an approach to the C6-C7 interspace  The area was   sterilely prepped three times using ChloroPrep x3 and sterilely draped  The skin was anesthetized with 3cc’s of 1% lidocaine in the midline at   this interspace        A 3 5 inch, 20-gauge Tuohy epidural needle was directed toward the   interspace under fluoroscopic guidance until the ligamentum flavum was engaged  From this point, a 45 degree contra-lateral oblique view   obtained  The epidural tuohy needle was gradually advanced using   loss-of-resistance (TAMARA) technique  Once TAMARA was obtained, 1cc Omnipaque   300 contrast solution was injected  An appropriate epidurogram was noted  Then, after negative aspiration, a solution consisting of 1-cc of 1%   lidocaine and 10mg dexamethasone and 2-cc preservative-free saline was   easily injected, demonstrating good washout of the dye  The needle was   then removed with a 1cc of 1% lidocaine flush  The patient’s neck was   cleaned and a bandage was placed over the site of needle insertion  Disposition: The patient tolerated the procedure well, and there were no   apparent complications  The patient was taken to the recovery area where   written discharge instructions for the procedure were given

## 2022-11-08 ENCOUNTER — CONSULT (OUTPATIENT)
Dept: HEMATOLOGY ONCOLOGY | Facility: CLINIC | Age: 32
End: 2022-11-08

## 2022-11-08 VITALS
DIASTOLIC BLOOD PRESSURE: 80 MMHG | SYSTOLIC BLOOD PRESSURE: 114 MMHG | TEMPERATURE: 98 F | OXYGEN SATURATION: 98 % | BODY MASS INDEX: 30.68 KG/M2 | WEIGHT: 226.5 LBS | RESPIRATION RATE: 16 BRPM | HEIGHT: 72 IN | HEART RATE: 97 BPM

## 2022-11-08 DIAGNOSIS — D72.19 OTHER EOSINOPHILIA: ICD-10-CM

## 2022-11-08 DIAGNOSIS — R53.83 FATIGUE, UNSPECIFIED TYPE: Primary | ICD-10-CM

## 2022-11-08 DIAGNOSIS — R79.0 DECREASED IRON STORES: ICD-10-CM

## 2022-11-08 RX ORDER — ISOTRETINOIN 20 MG/1
CAPSULE, LIQUID FILLED ORAL
COMMUNITY
Start: 2022-11-03

## 2022-11-08 RX ORDER — ALPRAZOLAM 0.25 MG/1
TABLET ORAL
COMMUNITY
Start: 2022-10-22

## 2022-11-08 RX ORDER — CLONAZEPAM 0.5 MG/1
TABLET ORAL
COMMUNITY
Start: 2022-11-04

## 2022-11-08 RX ORDER — ESCITALOPRAM OXALATE 20 MG/1
TABLET ORAL
COMMUNITY
Start: 2022-11-02

## 2022-11-18 ENCOUNTER — APPOINTMENT (OUTPATIENT)
Dept: LAB | Facility: HOSPITAL | Age: 32
End: 2022-11-18

## 2022-11-18 DIAGNOSIS — L57.8 NODULAR ELASTOSIS WITH CYSTS AND COMEDONES OF FAVRE AND RACOUCHOT: Primary | ICD-10-CM

## 2022-11-18 DIAGNOSIS — L70.0 NODULAR ELASTOSIS WITH CYSTS AND COMEDONES OF FAVRE AND RACOUCHOT: Primary | ICD-10-CM

## 2022-11-18 LAB
ALBUMIN SERPL BCP-MCNC: 4.3 G/DL (ref 3.5–5)
ALP SERPL-CCNC: 97 U/L (ref 46–116)
ALT SERPL W P-5'-P-CCNC: 46 U/L (ref 12–78)
AST SERPL W P-5'-P-CCNC: 36 U/L (ref 5–45)
BASOPHILS # BLD AUTO: 0.07 THOUSANDS/ÂΜL (ref 0–0.1)
BASOPHILS NFR BLD AUTO: 1 % (ref 0–1)
BILIRUB DIRECT SERPL-MCNC: 0.05 MG/DL (ref 0–0.2)
BILIRUB SERPL-MCNC: 0.46 MG/DL (ref 0.2–1)
CHOLEST SERPL-MCNC: 222 MG/DL
EOSINOPHIL # BLD AUTO: 0.59 THOUSAND/ÂΜL (ref 0–0.61)
EOSINOPHIL NFR BLD AUTO: 9 % (ref 0–6)
ERYTHROCYTE [DISTWIDTH] IN BLOOD BY AUTOMATED COUNT: 12.5 % (ref 11.6–15.1)
HCT VFR BLD AUTO: 40.4 % (ref 36.5–49.3)
HDLC SERPL-MCNC: 30 MG/DL
HGB BLD-MCNC: 14.7 G/DL (ref 12–17)
IMM GRANULOCYTES # BLD AUTO: 0.02 THOUSAND/UL (ref 0–0.2)
IMM GRANULOCYTES NFR BLD AUTO: 0 % (ref 0–2)
LYMPHOCYTES # BLD AUTO: 2.3 THOUSANDS/ÂΜL (ref 0.6–4.47)
LYMPHOCYTES NFR BLD AUTO: 33 % (ref 14–44)
MCH RBC QN AUTO: 29.8 PG (ref 26.8–34.3)
MCHC RBC AUTO-ENTMCNC: 36.4 G/DL (ref 31.4–37.4)
MCV RBC AUTO: 82 FL (ref 82–98)
MONOCYTES # BLD AUTO: 0.34 THOUSAND/ÂΜL (ref 0.17–1.22)
MONOCYTES NFR BLD AUTO: 5 % (ref 4–12)
NEUTROPHILS # BLD AUTO: 3.65 THOUSANDS/ÂΜL (ref 1.85–7.62)
NEUTS SEG NFR BLD AUTO: 52 % (ref 43–75)
NRBC BLD AUTO-RTO: 0 /100 WBCS
PLATELET # BLD AUTO: 280 THOUSANDS/UL (ref 149–390)
PMV BLD AUTO: 10.3 FL (ref 8.9–12.7)
PROT SERPL-MCNC: 7.8 G/DL (ref 6.4–8.4)
RBC # BLD AUTO: 4.94 MILLION/UL (ref 3.88–5.62)
TRIGL SERPL-MCNC: 541 MG/DL
WBC # BLD AUTO: 6.97 THOUSAND/UL (ref 4.31–10.16)

## 2022-11-19 LAB — LDLC SERPL DIRECT ASSAY-MCNC: 143 MG/DL (ref 0–100)

## 2023-01-03 ENCOUNTER — APPOINTMENT (OUTPATIENT)
Dept: LAB | Facility: HOSPITAL | Age: 33
End: 2023-01-03

## 2023-01-03 DIAGNOSIS — L57.8 NODULAR ELASTOSIS WITH CYSTS AND COMEDONES OF FAVRE AND RACOUCHOT: ICD-10-CM

## 2023-01-03 DIAGNOSIS — L70.0 NODULAR ELASTOSIS WITH CYSTS AND COMEDONES OF FAVRE AND RACOUCHOT: ICD-10-CM

## 2023-01-03 DIAGNOSIS — Z79.899 ENCOUNTER FOR LONG-TERM (CURRENT) USE OF OTHER MEDICATIONS: Primary | ICD-10-CM

## 2023-01-03 LAB
ALBUMIN SERPL BCP-MCNC: 4.7 G/DL (ref 3.5–5)
ALP SERPL-CCNC: 89 U/L (ref 46–116)
ALT SERPL W P-5'-P-CCNC: 72 U/L (ref 12–78)
AST SERPL W P-5'-P-CCNC: 36 U/L (ref 5–45)
BASOPHILS # BLD AUTO: 0.04 THOUSANDS/ÂΜL (ref 0–0.1)
BASOPHILS NFR BLD AUTO: 1 % (ref 0–1)
BILIRUB DIRECT SERPL-MCNC: 0.17 MG/DL (ref 0–0.2)
BILIRUB SERPL-MCNC: 0.84 MG/DL (ref 0.2–1)
CHOLEST SERPL-MCNC: 318 MG/DL
EOSINOPHIL # BLD AUTO: 0.39 THOUSAND/ÂΜL (ref 0–0.61)
EOSINOPHIL NFR BLD AUTO: 6 % (ref 0–6)
ERYTHROCYTE [DISTWIDTH] IN BLOOD BY AUTOMATED COUNT: 12.9 % (ref 11.6–15.1)
HCT VFR BLD AUTO: 44.5 % (ref 36.5–49.3)
HDLC SERPL-MCNC: 38 MG/DL
HGB BLD-MCNC: 15.4 G/DL (ref 12–17)
IMM GRANULOCYTES # BLD AUTO: 0.01 THOUSAND/UL (ref 0–0.2)
IMM GRANULOCYTES NFR BLD AUTO: 0 % (ref 0–2)
LDLC SERPL CALC-MCNC: 235 MG/DL (ref 0–100)
LYMPHOCYTES # BLD AUTO: 2.13 THOUSANDS/ÂΜL (ref 0.6–4.47)
LYMPHOCYTES NFR BLD AUTO: 35 % (ref 14–44)
MCH RBC QN AUTO: 29.7 PG (ref 26.8–34.3)
MCHC RBC AUTO-ENTMCNC: 34.6 G/DL (ref 31.4–37.4)
MCV RBC AUTO: 86 FL (ref 82–98)
MONOCYTES # BLD AUTO: 0.5 THOUSAND/ÂΜL (ref 0.17–1.22)
MONOCYTES NFR BLD AUTO: 8 % (ref 4–12)
NEUTROPHILS # BLD AUTO: 3.02 THOUSANDS/ÂΜL (ref 1.85–7.62)
NEUTS SEG NFR BLD AUTO: 50 % (ref 43–75)
NRBC BLD AUTO-RTO: 0 /100 WBCS
PLATELET # BLD AUTO: 256 THOUSANDS/UL (ref 149–390)
PMV BLD AUTO: 9.8 FL (ref 8.9–12.7)
PROT SERPL-MCNC: 8.4 G/DL (ref 6.4–8.4)
RBC # BLD AUTO: 5.19 MILLION/UL (ref 3.88–5.62)
TRIGL SERPL-MCNC: 226 MG/DL
WBC # BLD AUTO: 6.09 THOUSAND/UL (ref 4.31–10.16)

## 2023-04-19 PROBLEM — L73.1 PSEUDOFOLLICULITIS BARBAE: Status: RESOLVED | Noted: 2019-08-13 | Resolved: 2023-04-19

## 2023-04-19 PROBLEM — Z13.1 SCREENING FOR DIABETES MELLITUS: Status: RESOLVED | Noted: 2020-09-04 | Resolved: 2023-04-19

## 2023-04-19 PROBLEM — Z02.1 DRUG SCREENING, PRE-EMPLOYMENT: Status: RESOLVED | Noted: 2019-12-19 | Resolved: 2023-04-19

## 2023-04-19 PROBLEM — F41.9 ANXIETY: Status: ACTIVE | Noted: 2023-04-19

## 2023-04-19 PROBLEM — R74.8 ABNORMAL AST AND ALT: Status: RESOLVED | Noted: 2017-07-10 | Resolved: 2023-04-19

## 2023-04-19 PROBLEM — Z00.00 HEALTHCARE MAINTENANCE: Status: RESOLVED | Noted: 2018-10-29 | Resolved: 2023-04-19

## 2023-04-19 PROBLEM — Z13.220 SCREENING CHOLESTEROL LEVEL: Status: RESOLVED | Noted: 2020-09-04 | Resolved: 2023-04-19

## 2023-04-19 PROBLEM — Z00.00 PHYSICAL EXAM, ANNUAL: Status: RESOLVED | Noted: 2021-09-20 | Resolved: 2023-04-19

## 2023-04-24 ENCOUNTER — OFFICE VISIT (OUTPATIENT)
Dept: FAMILY MEDICINE CLINIC | Facility: CLINIC | Age: 33
End: 2023-04-24

## 2023-04-24 VITALS
DIASTOLIC BLOOD PRESSURE: 96 MMHG | HEIGHT: 72 IN | WEIGHT: 233 LBS | HEART RATE: 96 BPM | OXYGEN SATURATION: 97 % | SYSTOLIC BLOOD PRESSURE: 138 MMHG | TEMPERATURE: 98.6 F | BODY MASS INDEX: 31.56 KG/M2

## 2023-04-24 DIAGNOSIS — M50.10 HERNIATION OF CERVICAL INTERVERTEBRAL DISC WITH RADICULOPATHY: ICD-10-CM

## 2023-04-24 DIAGNOSIS — J31.0 CHRONIC RHINITIS: ICD-10-CM

## 2023-04-24 DIAGNOSIS — Z00.00 ANNUAL PHYSICAL EXAM: ICD-10-CM

## 2023-04-24 DIAGNOSIS — F41.9 ANXIETY: ICD-10-CM

## 2023-04-24 DIAGNOSIS — M54.12 CERVICAL RADICULOPATHY: ICD-10-CM

## 2023-04-24 DIAGNOSIS — F90.0 ADHD, PREDOMINANTLY INATTENTIVE TYPE: Primary | ICD-10-CM

## 2023-04-24 DIAGNOSIS — E78.49 OTHER HYPERLIPIDEMIA: ICD-10-CM

## 2023-04-24 DIAGNOSIS — E55.9 VITAMIN D DEFICIENCY: ICD-10-CM

## 2023-04-24 NOTE — ASSESSMENT & PLAN NOTE
Pt doesn't believe he was fasting for current lipid panel, will reorder with pt instructions to fast prior to testing

## 2023-04-24 NOTE — PROGRESS NOTES
[id number] [address] [place] [address]    NAME: Agustin Marino  AGE: 29 y o  SEX: male  : 1990     DATE: 2023     Assessment and Plan:     Problem List Items Addressed This Visit        Respiratory    Chronic rhinitis     No symptoms, not taking anything             Nervous and Auditory    Herniation of cervical intervertebral disc with radiculopathy     Tylenol or motrin as needed OTC, otherwise he doesn't really do anything for the pain         Cervical radiculopathy     PRN OTC tylenol and motrin             Other    ADHD, predominantly inattentive type - Primary     Patient is following with psychology, doing well on Seroquel, 25mg for sleep  Adderall 25mg and PRN 0 25mg as needed, reports that he doesn't need it often          Other hyperlipidemia     Pt doesn't believe he was fasting for current lipid panel, will reorder with pt instructions to fast prior to testing          Relevant Orders    Lipid Panel with Direct LDL reflex    Comprehensive metabolic panel    Vitamin D deficiency     Not currently taking anything          Relevant Orders    Vitamin D 25 hydroxy    Anxiety     Seeing Psychology, doing well on current medication regimen         Other Visit Diagnoses     Annual physical exam              Immunizations and preventive care screenings were discussed with patient today  Appropriate education was printed on patient's after visit summary  Counseling:  Dental Health: discussed importance of regular tooth brushing, flossing, and dental visits  Injury prevention: discussed safety/seat belts, safety helmets, smoke detectors, carbon dioxide detectors, and smoking near bedding or upholstery  Exercise: the importance of regular exercise/physical activity was discussed  Recommend exercise 3-5 times per week for at least 30 minutes  BMI Counseling: Body mass index is 31 6 kg/m²   The BMI is above normal  Nutrition recommendations include decreasing portion sizes, encouraging healthy choices of fruits and vegetables, decreasing fast food intake, consuming healthier snacks, limiting drinks that contain sugar, moderation in carbohydrate intake, increasing intake of lean protein, reducing intake of saturated and trans fat and reducing intake of cholesterol  Exercise recommendations include moderate physical activity 150 minutes/week  No pharmacotherapy was ordered  Patient referred to PCP  Rationale for BMI follow-up plan is due to patient being overweight or obese  No follow-ups on file  Chief Complaint:     No chief complaint on file  History of Present Illness:   Patient presents today to establish care, does not report any new issues today  Adult Annual Physical   Patient here for a comprehensive physical exam  The patient reports no problems  Diet and Physical Activity  Diet/Nutrition: well balanced diet  Exercise: no formal exercise  Depression Screening  PHQ-2/9 Depression Screening         General Health  Sleep: sleeps well  Hearing: normal - bilateral   Vision: no vision problems  Dental: regular dental visits   Health  History of STDs?: no      Review of Systems:     Review of Systems   Constitutional: Negative for chills, fatigue and fever  HENT: Negative for congestion, ear pain, sinus pressure, sneezing, sore throat, tinnitus and trouble swallowing  Eyes: Negative for pain and visual disturbance  Respiratory: Negative for cough, chest tightness and shortness of breath  Cardiovascular: Negative for chest pain, palpitations and leg swelling  Gastrointestinal: Negative for abdominal pain, blood in stool, constipation, diarrhea, nausea and vomiting  Endocrine: Negative  Genitourinary: Negative for dysuria, frequency, hematuria and urgency  Musculoskeletal: Negative for arthralgias and back pain  Skin: Negative for color change and rash  Allergic/Immunologic: Negative  Neurological: Negative for seizures and syncope  Hematological: Negative  Psychiatric/Behavioral: Negative  Negative for dysphoric mood and sleep disturbance  The patient is not nervous/anxious  All other systems reviewed and are negative       Past Medical History:     Past Medical History:   Diagnosis Date   • ADHD    • Anxiety    • No known problems       Past Surgical History:     Past Surgical History:   Procedure Laterality Date   • SHOULDER SURGERY  03/27/2015      Social History:     Social History     Socioeconomic History   • Marital status: Single     Spouse name: None   • Number of children: None   • Years of education: None   • Highest education level: None   Occupational History   • None   Tobacco Use   • Smoking status: Never   • Smokeless tobacco: Former     Types: Chew   • Tobacco comments:     Quit Jan 2016   Vaping Use   • Vaping Use: Never used   Substance and Sexual Activity   • Alcohol use: Yes     Comment: social   • Drug use: No   • Sexual activity: None   Other Topics Concern   • None   Social History Narrative    Caffeine use     Social Determinants of Health     Financial Resource Strain: Not on file   Food Insecurity: Not on file   Transportation Needs: Not on file   Physical Activity: Not on file   Stress: Not on file   Social Connections: Not on file   Intimate Partner Violence: Not on file   Housing Stability: Not on file      Family History:     Family History   Problem Relation Age of Onset   • Arthritis Mother    • Diabetes Father    • Hypertension Father    • Hyperlipidemia Father       Current Medications:     Current Outpatient Medications   Medication Sig Dispense Refill   • ALPRAZolam (XANAX) 0 25 mg tablet take 1 tablet by mouth daily if needed (MAY TAKE WITH CLONAZEPAM)     • QUEtiapine (SEROquel) 25 mg tablet Take 12 5-25 mg by mouth daily at bedtime  0   • ADDERALL XR, 25MG, 25 MG 24 hr capsule 25 mg every morning  0     No current facility-administered medications for this visit  Allergies:     No Known Allergies   Physical Exam:     /96 (BP Location: Left arm, Patient Position: Sitting, Cuff Size: Large)   Pulse 96   Temp 98 6 °F (37 °C)   Ht 6' (1 829 m)   Wt 106 kg (233 lb)   SpO2 97%   BMI 31 60 kg/m²     Physical Exam  Vitals and nursing note reviewed  Constitutional:       Appearance: Normal appearance  HENT:      Head: Normocephalic and atraumatic  Right Ear: Tympanic membrane normal       Left Ear: Tympanic membrane normal       Nose: Nose normal       Mouth/Throat:      Mouth: Mucous membranes are moist    Eyes:      Extraocular Movements: Extraocular movements intact  Pupils: Pupils are equal, round, and reactive to light  Cardiovascular:      Rate and Rhythm: Normal rate and regular rhythm  Pulses: Normal pulses  Heart sounds: Normal heart sounds  Pulmonary:      Effort: Pulmonary effort is normal       Breath sounds: Normal breath sounds  Abdominal:      Palpations: Abdomen is soft  Musculoskeletal:         General: Normal range of motion  Cervical back: Normal range of motion  Skin:     General: Skin is warm  Capillary Refill: Capillary refill takes less than 2 seconds  Neurological:      Mental Status: He is alert and oriented to person, place, and time  Mental status is at baseline  Psychiatric:         Mood and Affect: Mood normal          Behavior: Behavior normal          Thought Content:  Thought content normal          Judgment: Judgment normal           Khoi Cardenas

## 2023-04-24 NOTE — ASSESSMENT & PLAN NOTE
Patient is following with psychology, doing well on Seroquel, 25mg for sleep   Adderall 25mg and PRN 0 25mg as needed, reports that he doesn't need it often

## 2023-07-03 ENCOUNTER — APPOINTMENT (OUTPATIENT)
Dept: LAB | Facility: HOSPITAL | Age: 33
End: 2023-07-03
Payer: COMMERCIAL

## 2023-07-03 DIAGNOSIS — E83.52 SERUM CALCIUM ELEVATED: ICD-10-CM

## 2023-07-03 DIAGNOSIS — R53.83 OTHER FATIGUE: ICD-10-CM

## 2023-07-03 DIAGNOSIS — R77.9 ELEVATED SERUM PROTEIN LEVEL: Primary | ICD-10-CM

## 2023-07-03 DIAGNOSIS — E78.49 FAMILIAL COMBINED HYPERLIPIDEMIA: ICD-10-CM

## 2023-07-03 LAB
25(OH)D3 SERPL-MCNC: 24.4 NG/ML (ref 30–100)
ALBUMIN SERPL BCP-MCNC: 5.4 G/DL (ref 3.5–5)
ALP SERPL-CCNC: 75 U/L (ref 34–104)
ALT SERPL W P-5'-P-CCNC: 44 U/L (ref 7–52)
ANION GAP SERPL CALCULATED.3IONS-SCNC: 8 MMOL/L
AST SERPL W P-5'-P-CCNC: 41 U/L (ref 13–39)
BILIRUB SERPL-MCNC: 1.02 MG/DL (ref 0.2–1)
BUN SERPL-MCNC: 19 MG/DL (ref 5–25)
CALCIUM SERPL-MCNC: 10.3 MG/DL (ref 8.4–10.2)
CHLORIDE SERPL-SCNC: 100 MMOL/L (ref 96–108)
CHOLEST SERPL-MCNC: 271 MG/DL
CO2 SERPL-SCNC: 29 MMOL/L (ref 21–32)
CREAT SERPL-MCNC: 1.09 MG/DL (ref 0.6–1.3)
GFR SERPL CREATININE-BSD FRML MDRD: 90 ML/MIN/1.73SQ M
GLUCOSE P FAST SERPL-MCNC: 108 MG/DL (ref 65–99)
HDLC SERPL-MCNC: 39 MG/DL
LDLC SERPL CALC-MCNC: 192 MG/DL (ref 0–100)
POTASSIUM SERPL-SCNC: 3.9 MMOL/L (ref 3.5–5.3)
PROT SERPL-MCNC: 8.5 G/DL (ref 6.4–8.4)
SODIUM SERPL-SCNC: 137 MMOL/L (ref 135–147)
TRIGL SERPL-MCNC: 201 MG/DL

## 2023-07-03 PROCEDURE — 82306 VITAMIN D 25 HYDROXY: CPT

## 2023-07-03 PROCEDURE — 80061 LIPID PANEL: CPT

## 2023-07-03 PROCEDURE — 80053 COMPREHEN METABOLIC PANEL: CPT

## 2023-07-03 PROCEDURE — 36415 COLL VENOUS BLD VENIPUNCTURE: CPT

## 2023-07-05 ENCOUNTER — APPOINTMENT (OUTPATIENT)
Dept: LAB | Facility: HOSPITAL | Age: 33
End: 2023-07-05
Payer: COMMERCIAL

## 2023-07-05 DIAGNOSIS — E83.52 SERUM CALCIUM ELEVATED: ICD-10-CM

## 2023-07-05 DIAGNOSIS — E55.9 VITAMIN D DEFICIENCY: Primary | ICD-10-CM

## 2023-07-05 DIAGNOSIS — R77.9 ELEVATED SERUM PROTEIN LEVEL: Primary | ICD-10-CM

## 2023-07-05 LAB
ALBUMIN SERPL BCP-MCNC: 5.3 G/DL (ref 3.5–5)
ALP SERPL-CCNC: 76 U/L (ref 34–104)
ALT SERPL W P-5'-P-CCNC: 35 U/L (ref 7–52)
ANION GAP SERPL CALCULATED.3IONS-SCNC: 8 MMOL/L
AST SERPL W P-5'-P-CCNC: 40 U/L (ref 13–39)
BILIRUB SERPL-MCNC: 1.01 MG/DL (ref 0.2–1)
BUN SERPL-MCNC: 18 MG/DL (ref 5–25)
CALCIUM SERPL-MCNC: 10.2 MG/DL (ref 8.4–10.2)
CHLORIDE SERPL-SCNC: 101 MMOL/L (ref 96–108)
CO2 SERPL-SCNC: 28 MMOL/L (ref 21–32)
CREAT SERPL-MCNC: 1.32 MG/DL (ref 0.6–1.3)
GFR SERPL CREATININE-BSD FRML MDRD: 71 ML/MIN/1.73SQ M
GLUCOSE P FAST SERPL-MCNC: 102 MG/DL (ref 65–99)
POTASSIUM SERPL-SCNC: 4.3 MMOL/L (ref 3.5–5.3)
PROT SERPL-MCNC: 8.4 G/DL (ref 6.4–8.4)
PTH-INTACT SERPL-MCNC: 26 PG/ML (ref 12–88)
SODIUM SERPL-SCNC: 137 MMOL/L (ref 135–147)

## 2023-07-05 PROCEDURE — 80053 COMPREHEN METABOLIC PANEL: CPT

## 2023-07-05 PROCEDURE — 36415 COLL VENOUS BLD VENIPUNCTURE: CPT

## 2023-07-05 PROCEDURE — 83970 ASSAY OF PARATHORMONE: CPT

## 2023-07-05 PROCEDURE — 84166 PROTEIN E-PHORESIS/URINE/CSF: CPT

## 2023-07-05 PROCEDURE — 84165 PROTEIN E-PHORESIS SERUM: CPT

## 2023-07-05 PROCEDURE — 83521 IG LIGHT CHAINS FREE EACH: CPT

## 2023-07-05 RX ORDER — ERGOCALCIFEROL 1.25 MG/1
50000 CAPSULE ORAL WEEKLY
Qty: 12 CAPSULE | Refills: 1 | Status: SHIPPED | OUTPATIENT
Start: 2023-07-05 | End: 2024-01-01

## 2023-07-06 LAB
ALBUMIN SERPL ELPH-MCNC: 5.96 G/DL (ref 3.2–5.1)
ALBUMIN SERPL ELPH-MCNC: 67 % (ref 48–70)
ALBUMIN UR ELPH-MCNC: 100 %
ALPHA1 GLOB MFR UR ELPH: 0 %
ALPHA1 GLOB SERPL ELPH-MCNC: 0.23 G/DL (ref 0.15–0.47)
ALPHA1 GLOB SERPL ELPH-MCNC: 2.6 % (ref 1.8–7)
ALPHA2 GLOB MFR UR ELPH: 0 %
ALPHA2 GLOB SERPL ELPH-MCNC: 0.56 G/DL (ref 0.42–1.04)
ALPHA2 GLOB SERPL ELPH-MCNC: 6.3 % (ref 5.9–14.9)
B-GLOBULIN MFR UR ELPH: 0 %
BETA GLOB ABNORMAL SERPL ELPH-MCNC: 0.5 G/DL (ref 0.31–0.57)
BETA1 GLOB SERPL ELPH-MCNC: 5.6 % (ref 4.7–7.7)
BETA2 GLOB SERPL ELPH-MCNC: 4.7 % (ref 3.1–7.9)
BETA2+GAMMA GLOB SERPL ELPH-MCNC: 0.42 G/DL (ref 0.2–0.58)
GAMMA GLOB ABNORMAL SERPL ELPH-MCNC: 1.23 G/DL (ref 0.4–1.66)
GAMMA GLOB MFR UR ELPH: 0 %
GAMMA GLOB SERPL ELPH-MCNC: 13.8 % (ref 6.9–22.3)
IGG/ALB SER: 2.03 {RATIO} (ref 1.1–1.8)
KAPPA LC FREE SER-MCNC: 12.8 MG/L (ref 3.3–19.4)
KAPPA LC FREE/LAMBDA FREE SER: 1.35 {RATIO} (ref 0.26–1.65)
LAMBDA LC FREE SERPL-MCNC: 9.5 MG/L (ref 5.7–26.3)
PROT PATTERN SERPL ELPH-IMP: ABNORMAL
PROT PATTERN UR ELPH-IMP: NORMAL
PROT SERPL-MCNC: 8.9 G/DL (ref 6.4–8.2)
PROT UR-MCNC: 31 MG/DL

## 2023-07-06 PROCEDURE — 84165 PROTEIN E-PHORESIS SERUM: CPT | Performed by: PATHOLOGY

## 2023-07-06 PROCEDURE — 84166 PROTEIN E-PHORESIS/URINE/CSF: CPT | Performed by: PATHOLOGY

## 2023-07-07 ENCOUNTER — OFFICE VISIT (OUTPATIENT)
Dept: FAMILY MEDICINE CLINIC | Facility: CLINIC | Age: 33
End: 2023-07-07
Payer: COMMERCIAL

## 2023-07-07 VITALS
SYSTOLIC BLOOD PRESSURE: 124 MMHG | HEART RATE: 84 BPM | TEMPERATURE: 98 F | OXYGEN SATURATION: 98 % | WEIGHT: 220.4 LBS | HEIGHT: 72 IN | BODY MASS INDEX: 29.85 KG/M2 | DIASTOLIC BLOOD PRESSURE: 82 MMHG

## 2023-07-07 DIAGNOSIS — E78.49 OTHER HYPERLIPIDEMIA: Primary | ICD-10-CM

## 2023-07-07 DIAGNOSIS — E55.9 VITAMIN D DEFICIENCY: ICD-10-CM

## 2023-07-07 PROCEDURE — 99213 OFFICE O/P EST LOW 20 MIN: CPT | Performed by: NURSE PRACTITIONER

## 2023-07-07 RX ORDER — ROSUVASTATIN CALCIUM 5 MG/1
5 TABLET, COATED ORAL DAILY
Qty: 90 TABLET | Refills: 3 | Status: SHIPPED | OUTPATIENT
Start: 2023-07-07

## 2023-07-07 NOTE — PROGRESS NOTES
Name: Jony Marroquin      : 1990      MRN: [id number]  Encounter Provider: KIM Horne  Encounter Date: 2023   Encounter department: [address] [place] [address]     1. Other hyperlipidemia  Assessment & Plan: Will start a low dose of crestor 5 mg and recheck labs in 3 months     Orders:  -     rosuvastatin (CRESTOR) 5 mg tablet; Take 1 tablet (5 mg total) by mouth daily  -     Comprehensive metabolic panel; Future  -     Lipid Panel with Direct LDL reflex; Future  -     Comprehensive metabolic panel  -     Lipid Panel with Direct LDL reflex    2. Vitamin D deficiency  Assessment & Plan:  Ordered the weekly Vitamin D              Subjective      Patient here today to review his recent labs and reports that he has lost 13 pounds since his visit     Review of Systems   Constitutional: Positive for fatigue. Negative for activity change, appetite change, chills, diaphoresis, fever and unexpected weight change. HENT: Negative for congestion, ear pain, hearing loss, postnasal drip, sinus pressure, sinus pain, sneezing and sore throat. Eyes: Negative for pain, redness and visual disturbance. Respiratory: Negative for cough and shortness of breath. Cardiovascular: Negative for chest pain and leg swelling. Gastrointestinal: Negative for abdominal pain, diarrhea, nausea and vomiting. Musculoskeletal: Negative for arthralgias. Neurological: Negative for dizziness and light-headedness. Psychiatric/Behavioral: Negative for behavioral problems and dysphoric mood.        Current Outpatient Medications on File Prior to Visit   Medication Sig   • ADDERALL XR, 25MG, 25 MG 24 hr capsule 25 mg every morning   • ALPRAZolam (XANAX) 0.25 mg tablet take 1 tablet by mouth daily if needed (MAY TAKE WITH CLONAZEPAM)   • ergocalciferol (VITAMIN D2) 50,000 units Take 1 capsule (50,000 Units total) by mouth once a week   • QUEtiapine (SEROquel) 25 mg tablet Take 12.5-25 mg by mouth daily at bedtime       Objective     /82 (BP Location: Left arm, Patient Position: Sitting)   Pulse 84   Temp 98 °F (36.7 °C) (Temporal)   Ht 6' (1.829 m)   Wt 100 kg (220 lb 6.4 oz)   SpO2 98%   BMI 29.89 kg/m²     Physical Exam  Vitals and nursing note reviewed. Constitutional:       General: He is not in acute distress. Appearance: He is well-developed. HENT:      Head: Normocephalic and atraumatic. Right Ear: Tympanic membrane normal.      Left Ear: Tympanic membrane normal.   Eyes:      Pupils: Pupils are equal, round, and reactive to light. Neck:      Thyroid: No thyromegaly. Cardiovascular:      Rate and Rhythm: Normal rate and regular rhythm. Heart sounds: Normal heart sounds. No murmur heard. Pulmonary:      Effort: Pulmonary effort is normal. No respiratory distress. Breath sounds: Normal breath sounds. No wheezing. Abdominal:      General: Bowel sounds are normal.      Palpations: Abdomen is soft. Musculoskeletal:         General: Normal range of motion. Cervical back: Normal range of motion. Skin:     General: Skin is warm and dry. Neurological:      Mental Status: He is alert and oriented to person, place, and time.        KIM Schwartz

## 2023-10-09 ENCOUNTER — APPOINTMENT (OUTPATIENT)
Dept: LAB | Facility: HOSPITAL | Age: 33
End: 2023-10-09
Payer: COMMERCIAL

## 2023-10-09 DIAGNOSIS — E78.49 OTHER HYPERLIPIDEMIA: ICD-10-CM

## 2023-10-09 DIAGNOSIS — L70.0 NODULAR ELASTOSIS WITH CYSTS AND COMEDONES OF FAVRE AND RACOUCHOT: ICD-10-CM

## 2023-10-09 DIAGNOSIS — L57.8 NODULAR ELASTOSIS WITH CYSTS AND COMEDONES OF FAVRE AND RACOUCHOT: ICD-10-CM

## 2023-10-09 DIAGNOSIS — R53.83 FATIGUE, UNSPECIFIED TYPE: Primary | ICD-10-CM

## 2023-10-09 LAB
ALBUMIN SERPL BCP-MCNC: 5.3 G/DL (ref 3.5–5)
ALP SERPL-CCNC: 89 U/L (ref 34–104)
ALT SERPL W P-5'-P-CCNC: 28 U/L (ref 7–52)
ANION GAP SERPL CALCULATED.3IONS-SCNC: 7 MMOL/L
AST SERPL W P-5'-P-CCNC: 27 U/L (ref 13–39)
BILIRUB SERPL-MCNC: 0.67 MG/DL (ref 0.2–1)
BUN SERPL-MCNC: 13 MG/DL (ref 5–25)
CALCIUM SERPL-MCNC: 10.3 MG/DL (ref 8.4–10.2)
CHLORIDE SERPL-SCNC: 102 MMOL/L (ref 96–108)
CHOLEST SERPL-MCNC: 149 MG/DL
CO2 SERPL-SCNC: 29 MMOL/L (ref 21–32)
CREAT SERPL-MCNC: 1.45 MG/DL (ref 0.6–1.3)
GFR SERPL CREATININE-BSD FRML MDRD: 64 ML/MIN/1.73SQ M
GLUCOSE P FAST SERPL-MCNC: 96 MG/DL (ref 65–99)
HDLC SERPL-MCNC: 37 MG/DL
LDLC SERPL CALC-MCNC: 95 MG/DL (ref 0–100)
POTASSIUM SERPL-SCNC: 4.3 MMOL/L (ref 3.5–5.3)
PROT SERPL-MCNC: 8.6 G/DL (ref 6.4–8.4)
SODIUM SERPL-SCNC: 138 MMOL/L (ref 135–147)
TRIGL SERPL-MCNC: 86 MG/DL

## 2023-10-09 PROCEDURE — 80053 COMPREHEN METABOLIC PANEL: CPT

## 2023-10-09 PROCEDURE — 36415 COLL VENOUS BLD VENIPUNCTURE: CPT

## 2023-10-09 PROCEDURE — 80061 LIPID PANEL: CPT

## 2023-10-10 ENCOUNTER — OFFICE VISIT (OUTPATIENT)
Dept: FAMILY MEDICINE CLINIC | Facility: CLINIC | Age: 33
End: 2023-10-10
Payer: COMMERCIAL

## 2023-10-10 VITALS
HEIGHT: 72 IN | HEART RATE: 82 BPM | SYSTOLIC BLOOD PRESSURE: 132 MMHG | OXYGEN SATURATION: 99 % | WEIGHT: 226.4 LBS | TEMPERATURE: 97.8 F | DIASTOLIC BLOOD PRESSURE: 84 MMHG | BODY MASS INDEX: 30.66 KG/M2

## 2023-10-10 DIAGNOSIS — M50.10 HERNIATION OF CERVICAL INTERVERTEBRAL DISC WITH RADICULOPATHY: ICD-10-CM

## 2023-10-10 DIAGNOSIS — E78.49 OTHER HYPERLIPIDEMIA: Primary | ICD-10-CM

## 2023-10-10 DIAGNOSIS — R79.89 ELEVATED SERUM CREATININE: ICD-10-CM

## 2023-10-10 DIAGNOSIS — R61 HYPERHIDROSIS: ICD-10-CM

## 2023-10-10 DIAGNOSIS — F41.9 ANXIETY: ICD-10-CM

## 2023-10-10 DIAGNOSIS — F90.0 ADHD, PREDOMINANTLY INATTENTIVE TYPE: ICD-10-CM

## 2023-10-10 DIAGNOSIS — E55.9 VITAMIN D DEFICIENCY: ICD-10-CM

## 2023-10-10 DIAGNOSIS — J31.0 CHRONIC RHINITIS: ICD-10-CM

## 2023-10-10 DIAGNOSIS — M54.12 CERVICAL RADICULOPATHY: ICD-10-CM

## 2023-10-10 DIAGNOSIS — E61.1 LOW SERUM IRON: ICD-10-CM

## 2023-10-10 PROCEDURE — 99213 OFFICE O/P EST LOW 20 MIN: CPT | Performed by: NURSE PRACTITIONER

## 2023-10-10 NOTE — PROGRESS NOTES
Name: Amina Vasquez      : 1990      MRN: [id number]  Encounter Provider: KIM Moran  Encounter Date: 10/10/2023   Encounter department: [address] [place] [address]     1. Other hyperlipidemia  Assessment & Plan:  Crestor 5 mg daily much better controlled       2. Chronic rhinitis    3. Herniation of cervical intervertebral disc with radiculopathy    4. 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 Vitamin D deficiency  Assessment & Plan:  Vitamin D weekly       6. Low serum iron    7. Anxiety    8. ADHD, predominantly inattentive type  Assessment & Plan:  Patient is following with psychiatry every three months       9. Elevated serum creatinine  -     Basic metabolic panel; Future    10. Hyperhidrosis  -     aluminum chloride (DRYSOL) 20 % external solution; Apply topically daily at bedtime           Subjective      Patient here today for his check up and to review his recent labs. Patient reports that he is recovering from his recent illness. He had his labs completed yesterday and showing CBC normal findings and cmp is showing elevated creatinine and admits to not drinking enough fluids related to working all the time. Patient has issues with chronic perspiration and has had the issues for many years and is interested in trying a product to help with the sweating     Review of Systems   Constitutional: Negative for activity change, appetite change, chills, diaphoresis, fatigue, fever and unexpected weight change. HENT: Negative for congestion, ear pain, hearing loss, postnasal drip, sinus pressure, sinus pain, sneezing and sore throat. Eyes: Negative for pain, redness and visual disturbance. Respiratory: Negative for cough and shortness of breath. Cardiovascular: Negative for chest pain and leg swelling.    Gastrointestinal: Negative for abdominal pain, diarrhea, nausea and vomiting. Endocrine: Negative. Genitourinary: Negative. Musculoskeletal: Negative for arthralgias. Skin: Negative. Allergic/Immunologic: Negative. Neurological: Negative for dizziness and light-headedness. Hematological: Negative. Psychiatric/Behavioral: Negative for behavioral problems and dysphoric mood. Current Outpatient Medications on File Prior to Visit   Medication Sig   • ADDERALL XR, 25MG, 25 MG 24 hr capsule 25 mg every morning   • ALPRAZolam (XANAX) 0.25 mg tablet take 1 tablet by mouth daily if needed (MAY TAKE WITH CLONAZEPAM)   • ergocalciferol (VITAMIN D2) 50,000 units Take 1 capsule (50,000 Units total) by mouth once a week   • QUEtiapine (SEROquel) 25 mg tablet Take 12.5-25 mg by mouth daily at bedtime   • rosuvastatin (CRESTOR) 5 mg tablet Take 1 tablet (5 mg total) by mouth daily       Objective     /84 (BP Location: Left arm, Patient Position: Sitting)   Pulse 82   Temp 97.8 °F (36.6 °C) (Temporal)   Ht 6' (1.829 m)   Wt 103 kg (226 lb 6.4 oz)   SpO2 99%   BMI 30.71 kg/m²     Physical Exam  Vitals and nursing note reviewed. Constitutional:       General: He is not in acute distress. Appearance: He is well-developed. HENT:      Head: Normocephalic and atraumatic. Right Ear: Tympanic membrane normal.      Left Ear: Tympanic membrane normal.      Nose: Nose normal.      Mouth/Throat:      Mouth: Mucous membranes are moist.   Eyes:      Pupils: Pupils are equal, round, and reactive to light. Neck:      Thyroid: No thyromegaly. Cardiovascular:      Rate and Rhythm: Normal rate and regular rhythm. Heart sounds: Normal heart sounds. No murmur heard. Pulmonary:      Effort: Pulmonary effort is normal. No respiratory distress. Breath sounds: Normal breath sounds. No wheezing. Abdominal:      General: Bowel sounds are normal.      Palpations: Abdomen is soft. Musculoskeletal:         General: Normal range of motion. Cervical back: Normal range of motion. Skin:     General: Skin is warm and dry. Neurological:      General: No focal deficit present. Mental Status: He is alert and oriented to person, place, and time.    Psychiatric:         Mood and Affect: Mood normal.       KIM Paredes

## 2023-12-10 DIAGNOSIS — E55.9 VITAMIN D DEFICIENCY: ICD-10-CM

## 2023-12-11 RX ORDER — ERGOCALCIFEROL 1.25 MG/1
50000 CAPSULE ORAL WEEKLY
Qty: 12 CAPSULE | Refills: 1 | Status: SHIPPED | OUTPATIENT
Start: 2023-12-11

## 2023-12-28 DIAGNOSIS — R61 HYPERHIDROSIS: ICD-10-CM

## 2023-12-29 RX ORDER — ALUMINUM CHLORIDE 20 %
SOLUTION, NON-ORAL TOPICAL
Qty: 35 ML | Refills: 0 | Status: SHIPPED | OUTPATIENT
Start: 2023-12-29

## 2024-03-07 DIAGNOSIS — E78.49 OTHER HYPERLIPIDEMIA: ICD-10-CM

## 2024-03-08 RX ORDER — ROSUVASTATIN CALCIUM 5 MG/1
5 TABLET, COATED ORAL DAILY
Qty: 90 TABLET | Refills: 0 | Status: SHIPPED | OUTPATIENT
Start: 2024-03-08

## 2024-05-30 DIAGNOSIS — E78.49 OTHER HYPERLIPIDEMIA: ICD-10-CM

## 2024-05-31 RX ORDER — ROSUVASTATIN CALCIUM 5 MG/1
5 TABLET, COATED ORAL DAILY
Qty: 90 TABLET | Refills: 1 | Status: SHIPPED | OUTPATIENT
Start: 2024-05-31

## 2024-06-10 DIAGNOSIS — E55.9 VITAMIN D DEFICIENCY: ICD-10-CM

## 2024-06-10 RX ORDER — ERGOCALCIFEROL 1.25 MG/1
50000 CAPSULE ORAL WEEKLY
Qty: 12 CAPSULE | Refills: 1 | Status: SHIPPED | OUTPATIENT
Start: 2024-06-10

## 2024-06-11 ENCOUNTER — TELEPHONE (OUTPATIENT)
Age: 34
End: 2024-06-11

## 2024-06-11 ENCOUNTER — OFFICE VISIT (OUTPATIENT)
Dept: FAMILY MEDICINE CLINIC | Facility: CLINIC | Age: 34
End: 2024-06-11
Payer: COMMERCIAL

## 2024-06-11 VITALS
TEMPERATURE: 97.4 F | OXYGEN SATURATION: 97 % | HEART RATE: 94 BPM | BODY MASS INDEX: 31.56 KG/M2 | WEIGHT: 233 LBS | SYSTOLIC BLOOD PRESSURE: 124 MMHG | HEIGHT: 72 IN | DIASTOLIC BLOOD PRESSURE: 80 MMHG

## 2024-06-11 DIAGNOSIS — T75.3XXA SEA SICKNESS, INITIAL ENCOUNTER: ICD-10-CM

## 2024-06-11 DIAGNOSIS — R06.83 SNORING: ICD-10-CM

## 2024-06-11 DIAGNOSIS — Z00.00 ANNUAL PHYSICAL EXAM: Primary | ICD-10-CM

## 2024-06-11 DIAGNOSIS — R53.82 CHRONIC FATIGUE: ICD-10-CM

## 2024-06-11 PROCEDURE — 99395 PREV VISIT EST AGE 18-39: CPT | Performed by: NURSE PRACTITIONER

## 2024-06-11 RX ORDER — SCOLOPAMINE TRANSDERMAL SYSTEM 1 MG/1
1 PATCH, EXTENDED RELEASE TRANSDERMAL
Qty: 1 PATCH | Refills: 0 | Status: SHIPPED | OUTPATIENT
Start: 2024-06-11 | End: 2024-06-14

## 2024-06-11 NOTE — PROGRESS NOTES
Adult Annual Physical  Name: Angel Graham      : 1990      MRN: [id number]  Encounter Provider: KIM Pardo  Encounter Date: 2024   Encounter department: [facility]    Assessment & Plan   1. Annual physical exam  2. BMI 31.0-31.9,adult  3. Sea sickness, initial encounter  -     scopolamine (TRANSDERM-SCOP) 1 mg/3 days TD 72 hr patch; Place 1 patch on the skin over 72 hours every third day for 3 days  4. Snoring  -     Ambulatory Referral to Sleep Medicine; Future  5. Chronic fatigue  -     Ambulatory Referral to Sleep Medicine; Future    Immunizations and preventive care screenings were discussed with patient today. Appropriate education was printed on patient's after visit summary.    Counseling:  Injury prevention: discussed safety/seat belts, safety helmets, smoke detectors, carbon dioxide detectors, and smoking near bedding or upholstery.  Exercise: the importance of regular exercise/physical activity was discussed. Recommend exercise 3-5 times per week for at least 30 minutes.          History of Present Illness   {Disappearing Hyperlinks I Encounters * My Last Note * Since Last Visit * History :87007}  Adult Annual Physical:  Patient presents for annual physical. Patient here today for his annual and has no present complains .     Diet and Physical Activity:  - Diet/Nutrition: well balanced diet.  - Exercise: no formal exercise.    Depression Screening:  - PHQ-2 Score: 0    General Health:  - Sleep: sleeps well, snores loudly, > 8 hours of sleep on average and unrefreshing sleep. snoring  - Hearing: normal hearing bilateral ears.  - Vision: goes for regular eye exams and most recent eye exam < 1 year ago.  - Dental: brushes teeth twice daily and regular dental visits.     Health:    - Urinary symptoms: none.     Review of Systems   Constitutional:  Positive for fatigue. Negative for activity change, appetite change, chills, diaphoresis, fever and unexpected  weight change.   HENT:  Negative for congestion, ear pain, hearing loss, postnasal drip, sinus pressure, sinus pain, sneezing and sore throat.    Eyes:  Negative for pain, redness and visual disturbance.   Respiratory:  Negative for cough and shortness of breath.    Cardiovascular:  Negative for chest pain and leg swelling.   Gastrointestinal:  Negative for abdominal pain, diarrhea, nausea and vomiting.   Endocrine: Negative.    Genitourinary: Negative.    Musculoskeletal:  Negative for arthralgias.   Allergic/Immunologic: Negative.    Neurological:  Negative for dizziness and light-headedness.   Hematological: Negative.    Psychiatric/Behavioral:  Positive for sleep disturbance. Negative for behavioral problems and dysphoric mood.      Pertinent Medical History         Medical History Reviewed by provider this encounter:  Problems       Past Medical History   Past Medical History:   Diagnosis Date   • ADHD    • Anxiety    • No known problems      Past Surgical History:   Procedure Laterality Date   • SHOULDER SURGERY  03/27/2015     Family History   Problem Relation Age of Onset   • Arthritis Mother    • Diabetes Father    • Hypertension Father    • Hyperlipidemia Father      Current Outpatient Medications on File Prior to Visit   Medication Sig Dispense Refill   • ADDERALL XR, 25MG, 25 MG 24 hr capsule 25 mg every morning  0   • ALPRAZolam (XANAX) 0.25 mg tablet take 1 tablet by mouth daily if needed (MAY TAKE WITH CLONAZEPAM)     • Drysol 20 % external solution apply topically daily at bedtime 35 mL 0   • ergocalciferol (VITAMIN D2) 50,000 units take 1 capsule by mouth weekly 12 capsule 1   • QUEtiapine (SEROquel) 25 mg tablet Take 12.5-25 mg by mouth daily at bedtime  0   • rosuvastatin (CRESTOR) 5 mg tablet take 1 tablet by mouth once daily 90 tablet 1     No current facility-administered medications on file prior to visit.   No Known Allergies     Objective   {Disappearing Hyperlinks   Review Vitals * Enter  New Vitals * Results Review * Labs * Imaging * Cardiology * Procedures * Lung Cancer Screening :93033}  /80   Pulse 94   Temp (!) 97.4 °F (36.3 °C)   Ht 6' (1.829 m)   Wt 106 kg (233 lb)   SpO2 97%   BMI 31.60 kg/m²     Physical Exam  Constitutional:       General: He is not in acute distress.     Appearance: He is well-developed.   HENT:      Head: Normocephalic and atraumatic.      Right Ear: Tympanic membrane normal.      Left Ear: Tympanic membrane normal.      Nose: Nose normal.      Mouth/Throat:      Mouth: Mucous membranes are moist.   Eyes:      Pupils: Pupils are equal, round, and reactive to light.   Neck:      Thyroid: No thyromegaly.   Cardiovascular:      Rate and Rhythm: Normal rate and regular rhythm.      Heart sounds: Normal heart sounds. No murmur heard.  Pulmonary:      Effort: Pulmonary effort is normal. No respiratory distress.      Breath sounds: Normal breath sounds. No wheezing.   Abdominal:      General: Bowel sounds are normal.      Palpations: Abdomen is soft.   Musculoskeletal:         General: Normal range of motion.      Cervical back: Normal range of motion.   Skin:     General: Skin is warm and dry.   Neurological:      Mental Status: He is alert and oriented to person, place, and time.   Psychiatric:         Mood and Affect: Mood normal.       Administrative Statements {Disappearing Hyperlinks I  Level of Service * [facility] [place] [facility]\""P:[id number]}

## 2024-06-17 NOTE — TELEPHONE ENCOUNTER
Second attempt to reach pt to schedule appt for ss consult with dr brandt for snoring from referral

## 2024-06-20 NOTE — TELEPHONE ENCOUNTER
3rd and final attempt to schedule patient per pulm referral, left message. Closing referral, unable to contact patient.

## 2024-07-22 ENCOUNTER — APPOINTMENT (OUTPATIENT)
Dept: LAB | Facility: CLINIC | Age: 34
End: 2024-07-22
Payer: COMMERCIAL

## 2024-07-22 DIAGNOSIS — R79.89 ELEVATED SERUM CREATININE: ICD-10-CM

## 2024-07-22 LAB
25(OH)D3 SERPL-MCNC: 45.7 NG/ML (ref 30–100)
ALBUMIN SERPL BCG-MCNC: 4.9 G/DL (ref 3.5–5)
ALP SERPL-CCNC: 62 U/L (ref 34–104)
ALT SERPL W P-5'-P-CCNC: 47 U/L (ref 7–52)
ANION GAP SERPL CALCULATED.3IONS-SCNC: 14 MMOL/L (ref 4–13)
AST SERPL W P-5'-P-CCNC: 45 U/L (ref 13–39)
BASOPHILS # BLD AUTO: 0.06 THOUSANDS/ÂΜL (ref 0–0.1)
BASOPHILS NFR BLD AUTO: 1 % (ref 0–1)
BILIRUB DIRECT SERPL-MCNC: 0.2 MG/DL (ref 0–0.2)
BILIRUB SERPL-MCNC: 1.09 MG/DL (ref 0.2–1)
BUN SERPL-MCNC: 16 MG/DL (ref 5–25)
CALCIUM SERPL-MCNC: 9.9 MG/DL (ref 8.4–10.2)
CHLORIDE SERPL-SCNC: 99 MMOL/L (ref 96–108)
CHOLEST SERPL-MCNC: 190 MG/DL
CO2 SERPL-SCNC: 23 MMOL/L (ref 21–32)
CREAT SERPL-MCNC: 1.06 MG/DL (ref 0.6–1.3)
EOSINOPHIL # BLD AUTO: 0.36 THOUSAND/ÂΜL (ref 0–0.61)
EOSINOPHIL NFR BLD AUTO: 5 % (ref 0–6)
ERYTHROCYTE [DISTWIDTH] IN BLOOD BY AUTOMATED COUNT: 13 % (ref 11.6–15.1)
GFR SERPL CREATININE-BSD FRML MDRD: 93 ML/MIN/1.73SQ M
GLUCOSE P FAST SERPL-MCNC: 95 MG/DL (ref 65–99)
HCT VFR BLD AUTO: 44.3 % (ref 36.5–49.3)
HDLC SERPL-MCNC: 37 MG/DL
HGB BLD-MCNC: 15 G/DL (ref 12–17)
IMM GRANULOCYTES # BLD AUTO: 0.02 THOUSAND/UL (ref 0–0.2)
IMM GRANULOCYTES NFR BLD AUTO: 0 % (ref 0–2)
LDLC SERPL CALC-MCNC: 126 MG/DL (ref 0–100)
LYMPHOCYTES # BLD AUTO: 1.6 THOUSANDS/ÂΜL (ref 0.6–4.47)
LYMPHOCYTES NFR BLD AUTO: 24 % (ref 14–44)
MCH RBC QN AUTO: 29.1 PG (ref 26.8–34.3)
MCHC RBC AUTO-ENTMCNC: 33.9 G/DL (ref 31.4–37.4)
MCV RBC AUTO: 86 FL (ref 82–98)
MONOCYTES # BLD AUTO: 0.55 THOUSAND/ÂΜL (ref 0.17–1.22)
MONOCYTES NFR BLD AUTO: 8 % (ref 4–12)
NEUTROPHILS # BLD AUTO: 4.23 THOUSANDS/ÂΜL (ref 1.85–7.62)
NEUTS SEG NFR BLD AUTO: 62 % (ref 43–75)
NONHDLC SERPL-MCNC: 153 MG/DL
NRBC BLD AUTO-RTO: 0 /100 WBCS
PLATELET # BLD AUTO: 259 THOUSANDS/UL (ref 149–390)
PMV BLD AUTO: 10.5 FL (ref 8.9–12.7)
POTASSIUM SERPL-SCNC: 3.9 MMOL/L (ref 3.5–5.3)
PROT SERPL-MCNC: 7.8 G/DL (ref 6.4–8.4)
RBC # BLD AUTO: 5.15 MILLION/UL (ref 3.88–5.62)
SODIUM SERPL-SCNC: 136 MMOL/L (ref 135–147)
TRIGL SERPL-MCNC: 136 MG/DL
WBC # BLD AUTO: 6.82 THOUSAND/UL (ref 4.31–10.16)

## 2024-07-22 PROCEDURE — 80048 BASIC METABOLIC PNL TOTAL CA: CPT

## 2024-10-22 ENCOUNTER — TELEPHONE (OUTPATIENT)
Dept: NEUROLOGY | Facility: CLINIC | Age: 34
End: 2024-10-22

## 2024-10-22 NOTE — TELEPHONE ENCOUNTER
LVM informing patient that Dr. Bunn will not be in tomorrow 10/23/24 at 10:20 AM for his appointment.  Gave [phone] to reschedule appointment.

## 2024-11-29 DIAGNOSIS — E78.49 OTHER HYPERLIPIDEMIA: ICD-10-CM

## 2024-11-29 RX ORDER — ROSUVASTATIN CALCIUM 5 MG/1
5 TABLET, COATED ORAL DAILY
Qty: 90 TABLET | Refills: 1 | Status: SHIPPED | OUTPATIENT
Start: 2024-11-29

## 2025-01-12 PROBLEM — R79.89 ELEVATED SERUM CREATININE: Status: RESOLVED | Noted: 2023-10-10 | Resolved: 2025-01-12

## 2025-01-12 PROBLEM — E61.1 LOW SERUM IRON: Status: RESOLVED | Noted: 2022-05-09 | Resolved: 2025-01-12

## 2025-03-18 ENCOUNTER — TELEPHONE (OUTPATIENT)
Dept: NEUROLOGY | Facility: CLINIC | Age: 35
End: 2025-03-18

## 2025-03-27 ENCOUNTER — TELEPHONE (OUTPATIENT)
Age: 35
End: 2025-03-27

## 2025-03-27 NOTE — TELEPHONE ENCOUNTER
Pt called to reschedule his physical appointment with PCP for tomorrow. Pt rescheduled for Monday, 4/28/25 at 2:20 pm.

## 2025-03-28 ENCOUNTER — TELEPHONE (OUTPATIENT)
Dept: NEUROLOGY | Facility: CLINIC | Age: 35
End: 2025-03-28

## 2025-03-31 ENCOUNTER — OFFICE VISIT (OUTPATIENT)
Age: 35
End: 2025-03-31
Payer: COMMERCIAL

## 2025-03-31 VITALS
HEIGHT: 72 IN | BODY MASS INDEX: 30.75 KG/M2 | HEART RATE: 104 BPM | OXYGEN SATURATION: 95 % | WEIGHT: 227 LBS | SYSTOLIC BLOOD PRESSURE: 160 MMHG | DIASTOLIC BLOOD PRESSURE: 118 MMHG

## 2025-03-31 DIAGNOSIS — E66.09 CLASS 1 OBESITY DUE TO EXCESS CALORIES WITHOUT SERIOUS COMORBIDITY WITH BODY MASS INDEX (BMI) OF 30.0 TO 30.9 IN ADULT: ICD-10-CM

## 2025-03-31 DIAGNOSIS — R03.0 ELEVATED BLOOD PRESSURE READING: ICD-10-CM

## 2025-03-31 DIAGNOSIS — R06.83 SNORING: ICD-10-CM

## 2025-03-31 DIAGNOSIS — E66.811 CLASS 1 OBESITY DUE TO EXCESS CALORIES WITHOUT SERIOUS COMORBIDITY WITH BODY MASS INDEX (BMI) OF 30.0 TO 30.9 IN ADULT: ICD-10-CM

## 2025-03-31 DIAGNOSIS — R53.82 CHRONIC FATIGUE: ICD-10-CM

## 2025-03-31 DIAGNOSIS — G47.19 EXCESSIVE DAYTIME SLEEPINESS: Primary | ICD-10-CM

## 2025-03-31 PROCEDURE — 99204 OFFICE O/P NEW MOD 45 MIN: CPT | Performed by: INTERNAL MEDICINE

## 2025-03-31 RX ORDER — CEFDINIR 300 MG/1
300 CAPSULE ORAL EVERY 12 HOURS SCHEDULED
COMMUNITY
Start: 2025-03-31

## 2025-03-31 NOTE — PROGRESS NOTES
Name: Angel Graham      : 1990      MRN: [id number]  Encounter Provider: Tyrone Bunn MD  Encounter Date: 3/31/2025   Encounter department: [facility]    :  Assessment & Plan  Snoring    Orders:    Ambulatory Referral to Sleep Medicine    Chronic fatigue    Orders:    Ambulatory Referral to Sleep Medicine    Excessive daytime sleepiness  Suspected sleep apnea  Mallampati class 4, Body mass index is 30.79 kg/m².,  .    At risk for obstructive sleep apnea based on STOP BANG survey based on snoring, tiredness, male gender  S/s: Snoring, tiredness, excessive daytime sleepiness  [place] score: 7  I discussed in depth the diagnostic studies and treatment options involved with obstructive sleep apnea  I also discussed in depth the risk of leaving sleep apnea untreated including hypertension, heart failure, arrhythmia, MI and stroke.      Plan  Ordered home sleep study  Patient is amenable to CPAP    Orders:    Home Study; Future    Elevated blood pressure reading  Blood pressure 160/118  Patient took his Adderall this morning and states that he has not been sleeping very much in the past few days  I recommended he speak with his primary care physician about considering blood pressure medications as repeat blood pressure check at the end of the visit was similar         History of Present Illness     34-year-old male with past medical history of ADHD who presents for evaluation of suspected sleep apnea.  He reports loud snoring and snorting during sleep.  He has excessive daytime sleepiness but reports an [place] score of 7.  His concentration is decreased.  He has heartburn during sleep.  He goes to bed at midnight and it takes 45 minutes to fall asleep.  He wakes up 1-2 times at night to urinate.  He gets out of bed at 9 AM.  He gets 8 hours of sleep but rarely feels refreshed.  He drinks coffee and soda.  He does not breathe well through his nose.                  Sitting and reading:  (Patient-Rptd) Would never doze  Watching TV: (Patient-Rptd) Moderate chance of dozing  Sitting, inactive in a public place (e.g. a theatre or a meeting): (Patient-Rptd) Would never doze  As a passenger in a car for an hour without a break: (Patient-Rptd) Slight chance of dozing  Lying down to rest in the afternoon when circumstances permit: (Patient-Rptd) High chance of dozing  Sitting and talking to someone: (Patient-Rptd) Would never doze  Sitting quietly after a lunch without alcohol: (Patient-Rptd) Slight chance of dozing  In a car, while stopped for a few minutes in traffic: (Patient-Rptd) Would never doze  Total score: (Patient-Rptd) 7       Review of Systems   Constitutional: Negative.    HENT: Negative.     Eyes: Negative.    Respiratory: Negative.     Cardiovascular: Negative.    Gastrointestinal: Negative.    Endocrine: Negative.    Genitourinary: Negative.    Musculoskeletal: Negative.    Skin: Negative.    Allergic/Immunologic: Negative.    Neurological: Negative.    Hematological: Negative.    Psychiatric/Behavioral: Negative.       Pertinent positives/negatives included in HPI and also as noted:       Pertinent Medical History           Medical History Reviewed by provider this encounter:  Meds     .  Past Medical History   Past Medical History:   Diagnosis Date    ADHD     Anxiety     No known problems      Past Surgical History:   Procedure Laterality Date    SHOULDER SURGERY  03/27/2015     Family History   Problem Relation Age of Onset    Arthritis Mother     Diabetes Father     Hypertension Father     Hyperlipidemia Father       reports that he has never smoked. He has quit using smokeless tobacco.  His smokeless tobacco use included chew. He reports current alcohol use. He reports that he does not use drugs.  Current Outpatient Medications   Medication Instructions    ADDERALL XR, 25MG, 25 MG 24 hr capsule 25 mg, Every morning    ALPRAZolam (XANAX) 0.25 mg tablet take 1 tablet by mouth daily if  needed (MAY TAKE WITH CLONAZEPAM)    cefdinir (OMNICEF) 300 mg, Oral, Every 12 hours scheduled    Drysol 20 % external solution apply topically daily at bedtime    ergocalciferol (VITAMIN D2) 50,000 Units, Oral, Weekly    QUEtiapine (SEROQUEL) 12.5-25 mg, Daily at bedtime    rosuvastatin (CRESTOR) 5 mg, Oral, Daily    scopolamine (TRANSDERM-SCOP) 1 mg/3 days TD 72 hr patch 1 patch, Transdermal, Every 72 hours   No Known Allergies   Current Outpatient Medications on File Prior to Visit   Medication Sig Dispense Refill    ADDERALL XR, 25MG, 25 MG 24 hr capsule 25 mg every morning  0    cefdinir (OMNICEF) 300 mg capsule Take 300 mg by mouth every 12 (twelve) hours      QUEtiapine (SEROquel) 25 mg tablet Take 12.5-25 mg by mouth daily at bedtime  0    rosuvastatin (CRESTOR) 5 mg tablet take 1 tablet by mouth once daily 90 tablet 1    ALPRAZolam (XANAX) 0.25 mg tablet take 1 tablet by mouth daily if needed (MAY TAKE WITH CLONAZEPAM) (Patient not taking: Reported on 3/31/2025)      Drysol 20 % external solution apply topically daily at bedtime (Patient not taking: Reported on 3/31/2025) 35 mL 0    ergocalciferol (VITAMIN D2) 50,000 units take 1 capsule by mouth weekly (Patient not taking: Reported on 3/31/2025) 12 capsule 1    scopolamine (TRANSDERM-SCOP) 1 mg/3 days TD 72 hr patch Place 1 patch on the skin over 72 hours every third day for 3 days (Patient not taking: Reported on 3/31/2025) 1 patch 0     No current facility-administered medications on file prior to visit.      Social History     Tobacco Use    Smoking status: Never    Smokeless tobacco: Former     Types: Chew    Tobacco comments:     Quit Jan 2016   Vaping Use    Vaping status: Never Used   Substance and Sexual Activity    Alcohol use: Yes     Comment: social    Drug use: No    Sexual activity: Not on file     Objective   BP (!) 160/118 (BP Location: Left arm, Patient Position: Sitting, Cuff Size: Large)   Pulse 104   Ht 6' (1.829 m)   Wt 103 kg (227  lb)   SpO2 95%   BMI 30.79 kg/m²        Physical Exam  Vitals reviewed.   HENT:      Head: Normocephalic and atraumatic.      Nose: Nose normal.      Mouth/Throat:      Mouth: Mucous membranes are moist.   Eyes:      Extraocular Movements: Extraocular movements intact.      Pupils: Pupils are equal, round, and reactive to light.   Cardiovascular:      Rate and Rhythm: Normal rate and regular rhythm.      Pulses: Normal pulses.   Pulmonary:      Effort: Pulmonary effort is normal.      Breath sounds: Normal breath sounds.   Abdominal:      General: Abdomen is flat. Bowel sounds are normal.      Palpations: Abdomen is soft.   Musculoskeletal:         General: Normal range of motion.      Cervical back: Normal range of motion.   Skin:     General: Skin is warm.   Neurological:      Mental Status: He is alert. Mental status is at baseline.   Psychiatric:         Mood and Affect: Mood normal.       Visit Vitals  BP (!) 160/118 (BP Location: Left arm, Patient Position: Sitting, Cuff Size: Large)   Pulse 104   Ht 6' (1.829 m)   Wt 103 kg (227 lb)   SpO2 95%   BMI 30.79 kg/m²   Smoking Status Never   BSA 2.25 m²           Data  Lab Results   Component Value Date    HGB 15.0 07/22/2024    HCT 44.3 07/22/2024    MCV 86 07/22/2024      Lab Results   Component Value Date    GLUCOSE 107 (H) 07/06/2017    CALCIUM 9.9 07/22/2024     07/06/2017    K 3.9 07/22/2024    CO2 23 07/22/2024    CL 99 07/22/2024    BUN 16 07/22/2024    CREATININE 1.06 07/22/2024     Lab Results   Component Value Date    IRON 97 10/31/2022    TIBC 369 10/31/2022    FERRITIN 232 10/31/2022     Lab Results   Component Value Date    AST 45 (H) 07/22/2024    ALT 47 07/22/2024

## 2025-05-30 DIAGNOSIS — E78.49 OTHER HYPERLIPIDEMIA: ICD-10-CM

## 2025-05-30 RX ORDER — ROSUVASTATIN CALCIUM 5 MG/1
5 TABLET, COATED ORAL DAILY
Qty: 30 TABLET | Refills: 0 | Status: SHIPPED | OUTPATIENT
Start: 2025-05-30

## 2025-07-01 ENCOUNTER — OFFICE VISIT (OUTPATIENT)
Dept: FAMILY MEDICINE CLINIC | Facility: CLINIC | Age: 35
End: 2025-07-01
Payer: COMMERCIAL

## 2025-07-01 VITALS
SYSTOLIC BLOOD PRESSURE: 134 MMHG | HEART RATE: 101 BPM | OXYGEN SATURATION: 97 % | BODY MASS INDEX: 30.75 KG/M2 | TEMPERATURE: 98.4 F | HEIGHT: 72 IN | WEIGHT: 227 LBS | DIASTOLIC BLOOD PRESSURE: 90 MMHG

## 2025-07-01 DIAGNOSIS — Z82.69 FAMILY HISTORY OF GOUT: ICD-10-CM

## 2025-07-01 DIAGNOSIS — Z13.1 SCREENING FOR DIABETES MELLITUS: ICD-10-CM

## 2025-07-01 DIAGNOSIS — Z00.00 ANNUAL PHYSICAL EXAM: Primary | ICD-10-CM

## 2025-07-01 DIAGNOSIS — R53.82 CHRONIC FATIGUE: ICD-10-CM

## 2025-07-01 DIAGNOSIS — E78.49 OTHER HYPERLIPIDEMIA: ICD-10-CM

## 2025-07-01 PROCEDURE — 99395 PREV VISIT EST AGE 18-39: CPT | Performed by: NURSE PRACTITIONER

## 2025-07-01 RX ORDER — DEXTROAMPHETAMINE SACCHARATE, AMPHETAMINE ASPARTATE, DEXTROAMPHETAMINE SULFATE AND AMPHETAMINE SULFATE 3.75; 3.75; 3.75; 3.75 MG/1; MG/1; MG/1; MG/1
1 TABLET ORAL DAILY
COMMUNITY
Start: 2025-06-24

## 2025-07-01 RX ORDER — CLONAZEPAM 0.5 MG/1
1 TABLET ORAL 2 TIMES DAILY
COMMUNITY
Start: 2025-06-18

## 2025-07-01 RX ORDER — DEXTROAMPHETAMINE SACCHARATE, AMPHETAMINE ASPARTATE MONOHYDRATE, DEXTROAMPHETAMINE SULFATE AND AMPHETAMINE SULFATE 7.5; 7.5; 7.5; 7.5 MG/1; MG/1; MG/1; MG/1
1 CAPSULE, EXTENDED RELEASE ORAL DAILY
COMMUNITY
Start: 2025-06-24

## 2025-07-01 NOTE — PATIENT INSTRUCTIONS
"Patient Education     Routine physical for adults   The Basics   Written by the doctors and editors at [facility]   What is a physical? -- A physical is a routine visit, or \"check-up,\" with your doctor. You might also hear it called a \"wellness visit\" or \"preventive visit.\"  During each visit, the doctor will:   Ask about your physical and mental health   Ask about your habits, behaviors, and lifestyle   Do an exam   Give you vaccines if needed   Talk to you about any medicines you take   Give advice about your health   Answer your questions  Getting regular check-ups is an important part of taking care of your health. It can help your doctor find and treat any problems you have. But it's also important for preventing health problems.  A routine physical is different from a \"sick visit.\" A sick visit is when you see a doctor because of a health concern or problem. Since physicals are scheduled ahead of time, you can think about what you want to ask the doctor.  How often should I get a physical? -- It depends on your age and health. In general, for people age 21 years and older:   If you are younger than 50 years, you might be able to get a physical every 3 years.   If you are 50 years or older, your doctor might recommend a physical every year.  If you have an ongoing health condition, like diabetes or high blood pressure, your doctor will probably want to see you more often.  What happens during a physical? -- In general, each visit will include:   Physical exam - The doctor or nurse will check your height, weight, heart rate, and blood pressure. They will also look at your eyes and ears. They will ask about how you are feeling and whether you have any symptoms that bother you.   Medicines - It's a good idea to bring a list of all the medicines you take to each doctor visit. Your doctor will talk to you about your medicines and answer any questions. Tell them if you are having any side effects that bother you. You " "should also tell them if you are having trouble paying for any of your medicines.   Habits and behaviors - This includes:   Your diet   Your exercise habits   Whether you smoke, drink alcohol, or use drugs   Whether you are sexually active   Whether you feel safe at home  Your doctor will talk to you about things you can do to improve your health and lower your risk of health problems. They will also offer help and support. For example, if you want to quit smoking, they can give you advice and might prescribe medicines. If you want to improve your diet or get more physical activity, they can help you with this, too.   Lab tests, if needed - The tests you get will depend on your age and situation. For example, your doctor might want to check your:   Cholesterol   Blood sugar   Iron level   Vaccines - The recommended vaccines will depend on your age, health, and what vaccines you already had. Vaccines are very important because they can prevent certain serious or deadly infections.   Discussion of screening - \"Screening\" means checking for diseases or other health problems before they cause symptoms. Your doctor can recommend screening based on your age, risk, and preferences. This might include tests to check for:   Cancer, such as breast, prostate, cervical, ovarian, colorectal, prostate, lung, or skin cancer   Sexually transmitted infections, such as chlamydia and gonorrhea   Mental health conditions like depression and anxiety  Your doctor will talk to you about the different types of screening tests. They can help you decide which screenings to have. They can also explain what the results might mean.   Answering questions - The physical is a good time to ask the doctor or nurse questions about your health. If needed, they can refer you to other doctors or specialists, too.  Adults older than 65 years often need other care, too. As you get older, your doctor will talk to you about:   How to prevent falling at " home   Hearing or vision tests   Memory testing   How to take your medicines safely   Making sure that you have the help and support you need at home  All topics are updated as new evidence becomes available and our peer review process is complete.  This topic retrieved from [employer] on: May 02, 2024.  Topic 424263 Version 1.0  Release: 32.4.3 - C32.122  © 2024 UpToDate, Inc. and/or its affiliates. All rights reserved.  Consumer Information Use and Disclaimer   Disclaimer: This generalized information is a limited summary of diagnosis, treatment, and/or medication information. It is not meant to be comprehensive and should be used as a tool to help the user understand and/or assess potential diagnostic and treatment options. It does NOT include all information about conditions, treatments, medications, side effects, or risks that may apply to a specific patient. It is not intended to be medical advice or a substitute for the medical advice, diagnosis, or treatment of a health care provider based on the health care provider's examination and assessment of a patient's specific and unique circumstances. Patients must speak with a health care provider for complete information about their health, medical questions, and treatment options, including any risks or benefits regarding use of medications. This information does not endorse any treatments or medications as safe, effective, or approved for treating a specific patient. UpToDate, Inc. and its affiliates disclaim any warranty or liability relating to this information or the use thereof.The use of this information is governed by the Terms of Use, available at https://www.woltersZipwhipuwer.com/en/know/clinical-effectiveness-terms. 2024© UpToDate, Inc. and its affiliates and/or licensors. All rights reserved.  Copyright   © 2024 UpToDate, Inc. and/or its affiliates. All rights reserved.

## 2025-07-01 NOTE — PROGRESS NOTES
Adult Annual Physical  Name: Angel Graham      : 1990      MRN: [id number]  Encounter Provider: KIM Pardo  Encounter Date: 2025   Encounter department: [facility]    :  Assessment & Plan  Annual physical exam         Other hyperlipidemia    Orders:    Comprehensive metabolic panel; Future    Lipid Panel with Direct LDL reflex; Future    Screening for diabetes mellitus    Orders:    Hemoglobin A1C; Future    Chronic fatigue    Orders:    Comprehensive metabolic panel; Future    CBC and differential; Future    Family history of gout    Orders:    Uric acid; Future        Preventive Screenings:    - Prostate cancer screening: screening not indicated     Immunizations:  - Immunizations due: Tdap         History of Present Illness     Adult Annual Physical:  Patient presents for annual physical. Patient here today for his check up. Patient has no new troubles to report and has a sleep study coming up and following with psychiatry and is on a good medication regimen .     Diet and Physical Activity:  - Diet/Nutrition: no special diet.  - Exercise: no formal exercise.    Depression Screening:  - PHQ-2 Score: 0    General Health:  - Sleep: sleeps poorly and 7-8 hours of sleep on average.  - Hearing: normal hearing bilateral ears.  - Vision: no vision problems.  - Dental: regular dental visits, brushes teeth twice daily and floss regularly.     Health:    - Urinary symptoms: none.     Review of Systems   Constitutional:  Negative for chills and fever.   HENT:  Negative for ear pain and sore throat.    Eyes:  Negative for pain and visual disturbance.   Respiratory:  Negative for cough and shortness of breath.    Cardiovascular:  Negative for chest pain and palpitations.   Gastrointestinal:  Negative for abdominal pain and vomiting.   Genitourinary:  Negative for dysuria and hematuria.   Musculoskeletal:  Negative for arthralgias and back pain.   Skin:  Negative for color  change and rash.   Neurological:  Negative for seizures and syncope.   All other systems reviewed and are negative.        Objective   /90 (BP Location: Left arm, Patient Position: Sitting, Cuff Size: Large)   Pulse 101   Temp 98.4 °F (36.9 °C)   Ht 6' (1.829 m)   Wt 103 kg (227 lb)   SpO2 97%   BMI 30.79 kg/m²     Physical Exam  Constitutional:       General: He is not in acute distress.     Appearance: He is well-developed.   HENT:      Head: Normocephalic and atraumatic.     Eyes:      Pupils: Pupils are equal, round, and reactive to light.     Neck:      Thyroid: No thyromegaly.     Cardiovascular:      Rate and Rhythm: Normal rate and regular rhythm.      Heart sounds: Normal heart sounds. No murmur heard.  Pulmonary:      Effort: Pulmonary effort is normal. No respiratory distress.      Breath sounds: Normal breath sounds. No wheezing.   Abdominal:      General: Bowel sounds are normal.      Palpations: Abdomen is soft.     Musculoskeletal:         General: Normal range of motion.      Cervical back: Normal range of motion.     Skin:     General: Skin is warm and dry.     Neurological:      Mental Status: He is alert and oriented to person, place, and time.

## 2025-07-31 ENCOUNTER — APPOINTMENT (OUTPATIENT)
Dept: LAB | Facility: HOSPITAL | Age: 35
End: 2025-07-31
Payer: COMMERCIAL

## 2025-07-31 DIAGNOSIS — E78.49 OTHER HYPERLIPIDEMIA: ICD-10-CM

## 2025-07-31 DIAGNOSIS — R53.82 CHRONIC FATIGUE: ICD-10-CM

## 2025-07-31 DIAGNOSIS — Z13.1 SCREENING FOR DIABETES MELLITUS: ICD-10-CM

## 2025-07-31 DIAGNOSIS — Z82.69 FAMILY HISTORY OF GOUT: ICD-10-CM

## 2025-07-31 LAB
ALBUMIN SERPL BCG-MCNC: 5.4 G/DL (ref 3.5–5)
ALP SERPL-CCNC: 67 U/L (ref 34–104)
ALT SERPL W P-5'-P-CCNC: 27 U/L (ref 7–52)
ANION GAP SERPL CALCULATED.3IONS-SCNC: 7 MMOL/L (ref 4–13)
AST SERPL W P-5'-P-CCNC: 30 U/L (ref 13–39)
BASOPHILS # BLD AUTO: 0.06 THOUSANDS/ÂΜL (ref 0–0.1)
BASOPHILS NFR BLD AUTO: 1 % (ref 0–1)
BILIRUB SERPL-MCNC: 1.13 MG/DL (ref 0.2–1)
BUN SERPL-MCNC: 14 MG/DL (ref 5–25)
CALCIUM SERPL-MCNC: 10 MG/DL (ref 8.4–10.2)
CHLORIDE SERPL-SCNC: 102 MMOL/L (ref 96–108)
CHOLEST SERPL-MCNC: 179 MG/DL (ref ?–200)
CO2 SERPL-SCNC: 29 MMOL/L (ref 21–32)
CREAT SERPL-MCNC: 0.97 MG/DL (ref 0.6–1.3)
EOSINOPHIL # BLD AUTO: 0.49 THOUSAND/ÂΜL (ref 0–0.61)
EOSINOPHIL NFR BLD AUTO: 8 % (ref 0–6)
ERYTHROCYTE [DISTWIDTH] IN BLOOD BY AUTOMATED COUNT: 12.5 % (ref 11.6–15.1)
EST. AVERAGE GLUCOSE BLD GHB EST-MCNC: 100 MG/DL
GFR SERPL CREATININE-BSD FRML MDRD: 102 ML/MIN/1.73SQ M
GLUCOSE P FAST SERPL-MCNC: 104 MG/DL (ref 65–99)
HBA1C MFR BLD: 5.1 %
HCT VFR BLD AUTO: 44.9 % (ref 36.5–49.3)
HDLC SERPL-MCNC: 34 MG/DL
HGB BLD-MCNC: 16 G/DL (ref 12–17)
IMM GRANULOCYTES # BLD AUTO: 0.01 THOUSAND/UL (ref 0–0.2)
IMM GRANULOCYTES NFR BLD AUTO: 0 % (ref 0–2)
LDLC SERPL CALC-MCNC: 122 MG/DL (ref 0–100)
LYMPHOCYTES # BLD AUTO: 2.24 THOUSANDS/ÂΜL (ref 0.6–4.47)
LYMPHOCYTES NFR BLD AUTO: 39 % (ref 14–44)
MCH RBC QN AUTO: 29.6 PG (ref 26.8–34.3)
MCHC RBC AUTO-ENTMCNC: 35.6 G/DL (ref 31.4–37.4)
MCV RBC AUTO: 83 FL (ref 82–98)
MONOCYTES # BLD AUTO: 0.47 THOUSAND/ÂΜL (ref 0.17–1.22)
MONOCYTES NFR BLD AUTO: 8 % (ref 4–12)
NEUTROPHILS # BLD AUTO: 2.55 THOUSANDS/ÂΜL (ref 1.85–7.62)
NEUTS SEG NFR BLD AUTO: 44 % (ref 43–75)
NRBC BLD AUTO-RTO: 0 /100 WBCS
PLATELET # BLD AUTO: 236 THOUSANDS/UL (ref 149–390)
PMV BLD AUTO: 9.8 FL (ref 8.9–12.7)
POTASSIUM SERPL-SCNC: 4.1 MMOL/L (ref 3.5–5.3)
PROT SERPL-MCNC: 8.1 G/DL (ref 6.4–8.4)
RBC # BLD AUTO: 5.41 MILLION/UL (ref 3.88–5.62)
SODIUM SERPL-SCNC: 138 MMOL/L (ref 135–147)
TRIGL SERPL-MCNC: 117 MG/DL (ref ?–150)
URATE SERPL-MCNC: 7.4 MG/DL (ref 3.5–8.5)
WBC # BLD AUTO: 5.82 THOUSAND/UL (ref 4.31–10.16)

## 2025-07-31 PROCEDURE — 83036 HEMOGLOBIN GLYCOSYLATED A1C: CPT

## 2025-07-31 PROCEDURE — 84550 ASSAY OF BLOOD/URIC ACID: CPT

## 2025-07-31 PROCEDURE — 80061 LIPID PANEL: CPT

## 2025-07-31 PROCEDURE — 85025 COMPLETE CBC W/AUTO DIFF WBC: CPT

## 2025-07-31 PROCEDURE — 80053 COMPREHEN METABOLIC PANEL: CPT

## 2025-07-31 PROCEDURE — 36415 COLL VENOUS BLD VENIPUNCTURE: CPT

## 2025-08-01 RX ORDER — ROSUVASTATIN CALCIUM 5 MG/1
5 TABLET, COATED ORAL DAILY
Qty: 90 TABLET | Refills: 1 | Status: SHIPPED | OUTPATIENT
Start: 2025-08-01